# Patient Record
Sex: FEMALE | Race: WHITE | NOT HISPANIC OR LATINO | Employment: FULL TIME | URBAN - METROPOLITAN AREA
[De-identification: names, ages, dates, MRNs, and addresses within clinical notes are randomized per-mention and may not be internally consistent; named-entity substitution may affect disease eponyms.]

---

## 2017-03-23 ENCOUNTER — COMMUNICATION - HEALTHEAST (OUTPATIENT)
Dept: INTERNAL MEDICINE | Facility: CLINIC | Age: 57
End: 2017-03-23

## 2017-03-23 DIAGNOSIS — F41.9 ANXIETY: ICD-10-CM

## 2018-01-04 ENCOUNTER — COMMUNICATION - HEALTHEAST (OUTPATIENT)
Dept: INTERNAL MEDICINE | Facility: CLINIC | Age: 58
End: 2018-01-04

## 2018-01-04 DIAGNOSIS — F41.9 ANXIETY: ICD-10-CM

## 2018-01-12 ENCOUNTER — COMMUNICATION - HEALTHEAST (OUTPATIENT)
Dept: ADMINISTRATIVE | Facility: HOSPITAL | Age: 58
End: 2018-01-12

## 2018-03-06 ENCOUNTER — COMMUNICATION - HEALTHEAST (OUTPATIENT)
Dept: ONCOLOGY | Facility: HOSPITAL | Age: 58
End: 2018-03-06

## 2018-04-03 ENCOUNTER — OFFICE VISIT - HEALTHEAST (OUTPATIENT)
Dept: INTERNAL MEDICINE | Facility: CLINIC | Age: 58
End: 2018-04-03

## 2018-04-03 DIAGNOSIS — E55.9 VITAMIN D DEFICIENCY: ICD-10-CM

## 2018-04-03 DIAGNOSIS — D50.8 IRON DEFICIENCY ANEMIA SECONDARY TO INADEQUATE DIETARY IRON INTAKE: ICD-10-CM

## 2018-04-03 DIAGNOSIS — Z00.00 HEALTH CARE MAINTENANCE: ICD-10-CM

## 2018-04-03 DIAGNOSIS — Z78.0 MENOPAUSE: ICD-10-CM

## 2018-04-03 LAB
ALBUMIN SERPL-MCNC: 3.5 G/DL (ref 3.5–5)
ALBUMIN UR-MCNC: NEGATIVE MG/DL
ALP SERPL-CCNC: 134 U/L (ref 45–120)
ALT SERPL W P-5'-P-CCNC: 12 U/L (ref 0–45)
ANION GAP SERPL CALCULATED.3IONS-SCNC: 15 MMOL/L (ref 5–18)
APPEARANCE UR: CLEAR
AST SERPL W P-5'-P-CCNC: 19 U/L (ref 0–40)
BACTERIA #/AREA URNS HPF: ABNORMAL HPF
BILIRUB DIRECT SERPL-MCNC: 0.1 MG/DL
BILIRUB SERPL-MCNC: 0.3 MG/DL (ref 0–1)
BILIRUB UR QL STRIP: NEGATIVE
BUN SERPL-MCNC: 19 MG/DL (ref 8–22)
CALCIUM SERPL-MCNC: 9.9 MG/DL (ref 8.5–10.5)
CHLORIDE BLD-SCNC: 103 MMOL/L (ref 98–107)
CHOLEST SERPL-MCNC: 238 MG/DL
CO2 SERPL-SCNC: 23 MMOL/L (ref 22–31)
COLOR UR AUTO: YELLOW
CREAT SERPL-MCNC: 0.78 MG/DL (ref 0.6–1.1)
ERYTHROCYTE [DISTWIDTH] IN BLOOD BY AUTOMATED COUNT: 12.7 % (ref 11–14.5)
FASTING STATUS PATIENT QL REPORTED: NO
GFR SERPL CREATININE-BSD FRML MDRD: >60 ML/MIN/1.73M2
GLUCOSE BLD-MCNC: 84 MG/DL (ref 70–125)
GLUCOSE UR STRIP-MCNC: NEGATIVE MG/DL
HCT VFR BLD AUTO: 45 % (ref 35–47)
HDLC SERPL-MCNC: 57 MG/DL
HGB BLD-MCNC: 15 G/DL (ref 12–16)
HGB UR QL STRIP: ABNORMAL
KETONES UR STRIP-MCNC: NEGATIVE MG/DL
LDLC SERPL CALC-MCNC: 145 MG/DL
LEUKOCYTE ESTERASE UR QL STRIP: NEGATIVE
MCH RBC QN AUTO: 28.3 PG (ref 27–34)
MCHC RBC AUTO-ENTMCNC: 33.4 G/DL (ref 32–36)
MCV RBC AUTO: 85 FL (ref 80–100)
NITRATE UR QL: NEGATIVE
PH UR STRIP: 6 [PH] (ref 5–8)
PLATELET # BLD AUTO: 320 THOU/UL (ref 140–440)
PMV BLD AUTO: 7.3 FL (ref 7–10)
POTASSIUM BLD-SCNC: 4.4 MMOL/L (ref 3.5–5)
PROT SERPL-MCNC: 7.3 G/DL (ref 6–8)
RBC # BLD AUTO: 5.32 MILL/UL (ref 3.8–5.4)
RBC #/AREA URNS AUTO: ABNORMAL HPF
SODIUM SERPL-SCNC: 141 MMOL/L (ref 136–145)
SP GR UR STRIP: 1.02 (ref 1–1.03)
TRIGL SERPL-MCNC: 182 MG/DL
TSH SERPL DL<=0.005 MIU/L-ACNC: 1.74 UIU/ML (ref 0.3–5)
UROBILINOGEN UR STRIP-ACNC: ABNORMAL
WBC #/AREA URNS AUTO: ABNORMAL HPF
WBC: 11.3 THOU/UL (ref 4–11)

## 2018-04-04 LAB
25(OH)D3 SERPL-MCNC: 23.4 NG/ML (ref 30–80)
25(OH)D3 SERPL-MCNC: 23.4 NG/ML (ref 30–80)
HPV SOURCE: NORMAL
HUMAN PAPILLOMA VIRUS 16 DNA: NEGATIVE
HUMAN PAPILLOMA VIRUS 18 DNA: NEGATIVE
HUMAN PAPILLOMA VIRUS FINAL DIAGNOSIS: NORMAL
HUMAN PAPILLOMA VIRUS OTHER HR: NEGATIVE
SPECIMEN DESCRIPTION: NORMAL

## 2018-04-09 ENCOUNTER — COMMUNICATION - HEALTHEAST (OUTPATIENT)
Dept: INTERNAL MEDICINE | Facility: CLINIC | Age: 58
End: 2018-04-09

## 2018-04-09 LAB
BKR LAB AP ABNORMAL BLEEDING: NO
BKR LAB AP BIRTH CONTROL/HORMONES: NORMAL
BKR LAB AP CERVICAL APPEARANCE: NORMAL
BKR LAB AP GYN ADEQUACY: NORMAL
BKR LAB AP GYN INTERPRETATION: NORMAL
BKR LAB AP HPV REFLEX: NORMAL
BKR LAB AP LMP: NORMAL
BKR LAB AP PATIENT STATUS: NO
BKR LAB AP PREVIOUS ABNORMAL: NORMAL
BKR LAB AP PREVIOUS NORMAL: NORMAL
HIGH RISK?: NO
PATH REPORT.COMMENTS IMP SPEC: NORMAL
RESULT FLAG (HE HISTORICAL CONVERSION): NORMAL

## 2018-04-13 ENCOUNTER — RECORDS - HEALTHEAST (OUTPATIENT)
Dept: ADMINISTRATIVE | Facility: OTHER | Age: 58
End: 2018-04-13

## 2018-05-16 ENCOUNTER — COMMUNICATION - HEALTHEAST (OUTPATIENT)
Dept: INTERNAL MEDICINE | Facility: CLINIC | Age: 58
End: 2018-05-16

## 2018-05-29 ENCOUNTER — COMMUNICATION - HEALTHEAST (OUTPATIENT)
Dept: ONCOLOGY | Facility: HOSPITAL | Age: 58
End: 2018-05-29

## 2018-06-28 ENCOUNTER — COMMUNICATION - HEALTHEAST (OUTPATIENT)
Dept: ONCOLOGY | Facility: HOSPITAL | Age: 58
End: 2018-06-28

## 2018-08-05 ENCOUNTER — COMMUNICATION - HEALTHEAST (OUTPATIENT)
Dept: INTERNAL MEDICINE | Facility: CLINIC | Age: 58
End: 2018-08-05

## 2018-08-05 DIAGNOSIS — F41.9 ANXIETY: ICD-10-CM

## 2018-09-07 ENCOUNTER — COMMUNICATION - HEALTHEAST (OUTPATIENT)
Dept: INTERNAL MEDICINE | Facility: CLINIC | Age: 58
End: 2018-09-07

## 2018-09-14 ENCOUNTER — COMMUNICATION - HEALTHEAST (OUTPATIENT)
Dept: INTERNAL MEDICINE | Facility: CLINIC | Age: 58
End: 2018-09-14

## 2018-11-12 ENCOUNTER — COMMUNICATION - HEALTHEAST (OUTPATIENT)
Dept: INTERNAL MEDICINE | Facility: CLINIC | Age: 58
End: 2018-11-12

## 2018-11-12 DIAGNOSIS — R04.0 EPISTAXIS: ICD-10-CM

## 2019-02-18 ENCOUNTER — COMMUNICATION - HEALTHEAST (OUTPATIENT)
Dept: INTERNAL MEDICINE | Facility: CLINIC | Age: 59
End: 2019-02-18

## 2019-02-18 DIAGNOSIS — F41.9 ANXIETY: ICD-10-CM

## 2019-05-02 ENCOUNTER — COMMUNICATION - HEALTHEAST (OUTPATIENT)
Dept: INTERNAL MEDICINE | Facility: CLINIC | Age: 59
End: 2019-05-02

## 2019-05-02 DIAGNOSIS — F41.9 ANXIETY: ICD-10-CM

## 2019-06-11 ENCOUNTER — OFFICE VISIT - HEALTHEAST (OUTPATIENT)
Dept: INTERNAL MEDICINE | Facility: CLINIC | Age: 59
End: 2019-06-11

## 2019-06-11 DIAGNOSIS — D72.829 LEUKOCYTOSIS, UNSPECIFIED TYPE: ICD-10-CM

## 2019-06-11 DIAGNOSIS — E53.8 VITAMIN B12 DEFICIENCY (NON ANEMIC): ICD-10-CM

## 2019-06-11 DIAGNOSIS — E55.9 VITAMIN D DEFICIENCY: ICD-10-CM

## 2019-06-11 LAB
BASOPHILS # BLD AUTO: 0.1 THOU/UL (ref 0–0.2)
BASOPHILS NFR BLD AUTO: 1 % (ref 0–2)
EOSINOPHIL # BLD AUTO: 0.4 THOU/UL (ref 0–0.4)
EOSINOPHIL NFR BLD AUTO: 3 % (ref 0–6)
ERYTHROCYTE [DISTWIDTH] IN BLOOD BY AUTOMATED COUNT: 11.9 % (ref 11–14.5)
HCT VFR BLD AUTO: 44.6 % (ref 35–47)
HGB BLD-MCNC: 14.5 G/DL (ref 12–16)
LYMPHOCYTES # BLD AUTO: 2 THOU/UL (ref 0.8–4.4)
LYMPHOCYTES NFR BLD AUTO: 16 % (ref 20–40)
MCH RBC QN AUTO: 28.5 PG (ref 27–34)
MCHC RBC AUTO-ENTMCNC: 32.5 G/DL (ref 32–36)
MCV RBC AUTO: 88 FL (ref 80–100)
MONOCYTES # BLD AUTO: 1 THOU/UL (ref 0–0.9)
MONOCYTES NFR BLD AUTO: 8 % (ref 2–10)
NEUTROPHILS # BLD AUTO: 9.2 THOU/UL (ref 2–7.7)
NEUTROPHILS NFR BLD AUTO: 72 % (ref 50–70)
PLATELET # BLD AUTO: 348 THOU/UL (ref 140–440)
PMV BLD AUTO: 7.9 FL (ref 7–10)
RBC # BLD AUTO: 5.09 MILL/UL (ref 3.8–5.4)
VIT B12 SERPL-MCNC: 791 PG/ML (ref 213–816)
WBC: 12.7 THOU/UL (ref 4–11)

## 2019-06-12 LAB
25(OH)D3 SERPL-MCNC: 32.7 NG/ML (ref 30–80)
25(OH)D3 SERPL-MCNC: 32.7 NG/ML (ref 30–80)
BASOPHILS # BLD AUTO: 0.1 THOU/UL (ref 0–0.2)
BASOPHILS NFR BLD AUTO: 1 % (ref 0–2)
EOSINOPHIL # BLD AUTO: 0.3 THOU/UL (ref 0–0.4)
EOSINOPHIL NFR BLD AUTO: 3 % (ref 0–6)
ERYTHROCYTE [DISTWIDTH] IN BLOOD BY AUTOMATED COUNT: 13.9 % (ref 11–14.5)
HCT VFR BLD AUTO: 46.2 % (ref 35–47)
HGB BLD-MCNC: 14.4 G/DL (ref 12–16)
LAB AP CHARGES (HE HISTORICAL CONVERSION): NORMAL
LYMPHOCYTES # BLD AUTO: 1.9 THOU/UL (ref 0.8–4.4)
LYMPHOCYTES NFR BLD AUTO: 16 % (ref 20–40)
MCH RBC QN AUTO: 29.8 PG (ref 27–34)
MCHC RBC AUTO-ENTMCNC: 31.2 G/DL (ref 32–36)
MCV RBC AUTO: 96 FL (ref 80–100)
MONOCYTES # BLD AUTO: 0.9 THOU/UL (ref 0–0.9)
MONOCYTES NFR BLD AUTO: 7 % (ref 2–10)
NEUTROPHILS # BLD AUTO: 9 THOU/UL (ref 2–7.7)
NEUTROPHILS NFR BLD AUTO: 74 % (ref 50–70)
PATH REPORT.COMMENTS IMP SPEC: NORMAL
PATH REPORT.COMMENTS IMP SPEC: NORMAL
PATH REPORT.FINAL DX SPEC: NORMAL
PATH REPORT.MICROSCOPIC SPEC OTHER STN: ABNORMAL
PATH REPORT.MICROSCOPIC SPEC OTHER STN: NORMAL
PATH REPORT.RELEVANT HX SPEC: NORMAL
PLATELET # BLD AUTO: 323 THOU/UL (ref 140–440)
PMV BLD AUTO: 10.1 FL (ref 8.5–12.5)
RBC # BLD AUTO: 4.84 MILL/UL (ref 3.8–5.4)
WBC: 12.2 THOU/UL (ref 4–11)

## 2019-06-20 ENCOUNTER — COMMUNICATION - HEALTHEAST (OUTPATIENT)
Dept: INTERNAL MEDICINE | Facility: CLINIC | Age: 59
End: 2019-06-20

## 2019-07-15 ENCOUNTER — COMMUNICATION - HEALTHEAST (OUTPATIENT)
Dept: INTERNAL MEDICINE | Facility: CLINIC | Age: 59
End: 2019-07-15

## 2019-08-12 ENCOUNTER — COMMUNICATION - HEALTHEAST (OUTPATIENT)
Dept: INTERNAL MEDICINE | Facility: CLINIC | Age: 59
End: 2019-08-12

## 2019-08-12 DIAGNOSIS — F41.9 ANXIETY: ICD-10-CM

## 2019-08-13 ENCOUNTER — AMBULATORY - HEALTHEAST (OUTPATIENT)
Dept: INTERNAL MEDICINE | Facility: CLINIC | Age: 59
End: 2019-08-13

## 2019-08-13 DIAGNOSIS — F41.9 ANXIETY: ICD-10-CM

## 2020-01-24 ENCOUNTER — OFFICE VISIT - HEALTHEAST (OUTPATIENT)
Dept: INTERNAL MEDICINE | Facility: CLINIC | Age: 60
End: 2020-01-24

## 2020-01-24 DIAGNOSIS — M25.511 ACUTE PAIN OF RIGHT SHOULDER: ICD-10-CM

## 2020-01-24 DIAGNOSIS — R10.11 RUQ ABDOMINAL PAIN: ICD-10-CM

## 2020-01-24 DIAGNOSIS — Z12.11 COLON CANCER SCREENING: ICD-10-CM

## 2020-01-24 DIAGNOSIS — F41.1 ANXIETY STATE: ICD-10-CM

## 2020-01-24 DIAGNOSIS — I89.0 LYMPHEDEMA OF RIGHT ARM: ICD-10-CM

## 2020-01-24 DIAGNOSIS — G62.9 NEUROPATHY: ICD-10-CM

## 2020-01-24 DIAGNOSIS — Z11.4 SCREENING FOR HIV (HUMAN IMMUNODEFICIENCY VIRUS): ICD-10-CM

## 2020-01-24 DIAGNOSIS — Z11.59 NEED FOR HEPATITIS C SCREENING TEST: ICD-10-CM

## 2020-01-24 LAB
ALBUMIN SERPL-MCNC: 3.5 G/DL (ref 3.5–5)
ALBUMIN UR-MCNC: NEGATIVE MG/DL
ALP SERPL-CCNC: 253 U/L (ref 45–120)
ALT SERPL W P-5'-P-CCNC: 184 U/L (ref 0–45)
ANION GAP SERPL CALCULATED.3IONS-SCNC: 13 MMOL/L (ref 5–18)
APPEARANCE UR: CLEAR
AST SERPL W P-5'-P-CCNC: 182 U/L (ref 0–40)
BILIRUB SERPL-MCNC: 1 MG/DL (ref 0–1)
BILIRUB UR QL STRIP: NEGATIVE
BUN SERPL-MCNC: 15 MG/DL (ref 8–22)
CALCIUM SERPL-MCNC: 9.2 MG/DL (ref 8.5–10.5)
CHLORIDE BLD-SCNC: 106 MMOL/L (ref 98–107)
CO2 SERPL-SCNC: 23 MMOL/L (ref 22–31)
COLOR UR AUTO: YELLOW
CREAT SERPL-MCNC: 0.72 MG/DL (ref 0.6–1.1)
GFR SERPL CREATININE-BSD FRML MDRD: >60 ML/MIN/1.73M2
GLUCOSE BLD-MCNC: 103 MG/DL (ref 70–125)
GLUCOSE UR STRIP-MCNC: NEGATIVE MG/DL
HGB UR QL STRIP: NEGATIVE
HIV 1+2 AB+HIV1 P24 AG SERPL QL IA: NEGATIVE
KETONES UR STRIP-MCNC: NEGATIVE MG/DL
LEUKOCYTE ESTERASE UR QL STRIP: NEGATIVE
LIPASE SERPL-CCNC: 40 U/L (ref 0–52)
NITRATE UR QL: NEGATIVE
PH UR STRIP: 6 [PH] (ref 4.5–8)
POTASSIUM BLD-SCNC: 3.8 MMOL/L (ref 3.5–5)
PROT SERPL-MCNC: 7 G/DL (ref 6–8)
SODIUM SERPL-SCNC: 142 MMOL/L (ref 136–145)
SP GR UR STRIP: 1.02 (ref 1–1.03)
TSH SERPL DL<=0.005 MIU/L-ACNC: 1.48 UIU/ML (ref 0.3–5)
UROBILINOGEN UR STRIP-ACNC: NORMAL

## 2020-01-25 LAB — HCV AB SERPL QL IA: NEGATIVE

## 2020-01-30 ENCOUNTER — AMBULATORY - HEALTHEAST (OUTPATIENT)
Dept: INTERNAL MEDICINE | Facility: CLINIC | Age: 60
End: 2020-01-30

## 2020-01-30 ENCOUNTER — COMMUNICATION - HEALTHEAST (OUTPATIENT)
Dept: INTERNAL MEDICINE | Facility: CLINIC | Age: 60
End: 2020-01-30

## 2020-01-30 ENCOUNTER — HOSPITAL ENCOUNTER (OUTPATIENT)
Dept: ULTRASOUND IMAGING | Facility: HOSPITAL | Age: 60
Discharge: HOME | End: 2020-01-30
Attending: INTERNAL MEDICINE

## 2020-01-30 DIAGNOSIS — R10.11 RUQ ABDOMINAL PAIN: ICD-10-CM

## 2020-01-30 DIAGNOSIS — K80.20 CALCULUS OF GALLBLADDER WITHOUT CHOLECYSTITIS WITHOUT OBSTRUCTION: ICD-10-CM

## 2020-02-19 ENCOUNTER — ANESTHESIA - HEALTHEAST (OUTPATIENT)
Dept: SURGERY | Facility: CLINIC | Age: 60
End: 2020-02-19

## 2020-02-19 ENCOUNTER — SURGERY - HEALTHEAST (OUTPATIENT)
Dept: SURGERY | Facility: CLINIC | Age: 60
End: 2020-02-19

## 2020-02-19 ASSESSMENT — MIFFLIN-ST. JEOR: SCORE: 1699.09

## 2020-03-08 ENCOUNTER — RECORDS - HEALTHEAST (OUTPATIENT)
Dept: ADMINISTRATIVE | Facility: OTHER | Age: 60
End: 2020-03-08

## 2020-03-08 LAB — COLOGUARD-ABSTRACT: NEGATIVE

## 2020-03-17 ENCOUNTER — RECORDS - HEALTHEAST (OUTPATIENT)
Dept: HEALTH INFORMATION MANAGEMENT | Facility: CLINIC | Age: 60
End: 2020-03-17

## 2020-04-03 ENCOUNTER — COMMUNICATION - HEALTHEAST (OUTPATIENT)
Dept: INTERNAL MEDICINE | Facility: CLINIC | Age: 60
End: 2020-04-03

## 2020-04-07 ENCOUNTER — COMMUNICATION - HEALTHEAST (OUTPATIENT)
Dept: INTERNAL MEDICINE | Facility: CLINIC | Age: 60
End: 2020-04-07

## 2020-04-07 DIAGNOSIS — R52 PAIN: ICD-10-CM

## 2020-04-14 ENCOUNTER — COMMUNICATION - HEALTHEAST (OUTPATIENT)
Dept: INTERNAL MEDICINE | Facility: CLINIC | Age: 60
End: 2020-04-14

## 2020-04-14 DIAGNOSIS — G62.9 NEUROPATHY: ICD-10-CM

## 2020-04-14 DIAGNOSIS — R52 PAIN: ICD-10-CM

## 2020-04-27 ENCOUNTER — COMMUNICATION - HEALTHEAST (OUTPATIENT)
Dept: INTERNAL MEDICINE | Facility: CLINIC | Age: 60
End: 2020-04-27

## 2020-06-21 ENCOUNTER — COMMUNICATION - HEALTHEAST (OUTPATIENT)
Dept: INTERNAL MEDICINE | Facility: CLINIC | Age: 60
End: 2020-06-21

## 2020-06-23 ENCOUNTER — COMMUNICATION - HEALTHEAST (OUTPATIENT)
Dept: LAB | Facility: CLINIC | Age: 60
End: 2020-06-23

## 2020-07-29 ENCOUNTER — OFFICE VISIT - HEALTHEAST (OUTPATIENT)
Dept: INTERNAL MEDICINE | Facility: CLINIC | Age: 60
End: 2020-07-29

## 2020-07-29 DIAGNOSIS — E66.01 MORBID OBESITY (H): ICD-10-CM

## 2020-07-29 DIAGNOSIS — G62.9 NEUROPATHY: ICD-10-CM

## 2020-07-29 DIAGNOSIS — Z90.49 S/P LAPAROSCOPIC CHOLECYSTECTOMY: ICD-10-CM

## 2020-07-29 DIAGNOSIS — M75.101 ROTATOR CUFF SYNDROME, RIGHT: ICD-10-CM

## 2020-07-29 DIAGNOSIS — C50.911: ICD-10-CM

## 2020-07-29 ASSESSMENT — PATIENT HEALTH QUESTIONNAIRE - PHQ9: SUM OF ALL RESPONSES TO PHQ QUESTIONS 1-9: 0

## 2020-08-15 ENCOUNTER — COMMUNICATION - HEALTHEAST (OUTPATIENT)
Dept: INTERNAL MEDICINE | Facility: CLINIC | Age: 60
End: 2020-08-15

## 2020-08-17 ENCOUNTER — AMBULATORY - HEALTHEAST (OUTPATIENT)
Dept: INTERNAL MEDICINE | Facility: CLINIC | Age: 60
End: 2020-08-17

## 2020-08-17 DIAGNOSIS — G62.9 NEUROPATHY: ICD-10-CM

## 2020-08-26 ENCOUNTER — OFFICE VISIT - HEALTHEAST (OUTPATIENT)
Dept: OTOLARYNGOLOGY | Facility: CLINIC | Age: 60
End: 2020-08-26

## 2020-08-26 DIAGNOSIS — R04.0 EPISTAXIS: ICD-10-CM

## 2020-08-27 ENCOUNTER — COMMUNICATION - HEALTHEAST (OUTPATIENT)
Dept: INTERNAL MEDICINE | Facility: CLINIC | Age: 60
End: 2020-08-27

## 2020-09-11 ENCOUNTER — COMMUNICATION - HEALTHEAST (OUTPATIENT)
Dept: INTERNAL MEDICINE | Facility: CLINIC | Age: 60
End: 2020-09-11

## 2020-10-26 ENCOUNTER — COMMUNICATION - HEALTHEAST (OUTPATIENT)
Dept: INTERNAL MEDICINE | Facility: CLINIC | Age: 60
End: 2020-10-26

## 2020-11-02 ENCOUNTER — AMBULATORY - HEALTHEAST (OUTPATIENT)
Dept: INTERNAL MEDICINE | Facility: CLINIC | Age: 60
End: 2020-11-02

## 2020-11-02 DIAGNOSIS — G62.9 NEUROPATHY: ICD-10-CM

## 2020-11-04 ENCOUNTER — COMMUNICATION - HEALTHEAST (OUTPATIENT)
Dept: INTERNAL MEDICINE | Facility: CLINIC | Age: 60
End: 2020-11-04

## 2020-11-20 ENCOUNTER — OFFICE VISIT - HEALTHEAST (OUTPATIENT)
Dept: PHARMACY | Facility: CLINIC | Age: 60
End: 2020-11-20

## 2020-11-20 DIAGNOSIS — T45.1X5A CHEMOTHERAPY-INDUCED NEUROPATHY (H): ICD-10-CM

## 2020-11-20 DIAGNOSIS — G62.0 CHEMOTHERAPY-INDUCED NEUROPATHY (H): ICD-10-CM

## 2020-11-20 DIAGNOSIS — F41.1 ANXIETY STATE: ICD-10-CM

## 2020-11-20 DIAGNOSIS — M19.90 ARTHRITIS: ICD-10-CM

## 2020-11-20 PROCEDURE — 99607 MTMS BY PHARM ADDL 15 MIN: CPT | Performed by: PHARMACIST

## 2020-11-20 PROCEDURE — 99605 MTMS BY PHARM NP 15 MIN: CPT | Performed by: PHARMACIST

## 2020-11-20 RX ORDER — LORAZEPAM 1 MG/1
1 TABLET ORAL PRN
Status: SHIPPED | COMMUNITY
Start: 2020-11-20

## 2020-11-20 RX ORDER — ACETAMINOPHEN 500 MG
500-1000 TABLET ORAL 3 TIMES DAILY PRN
Status: SHIPPED | COMMUNITY
Start: 2020-11-20 | End: 2021-12-29

## 2020-11-21 ENCOUNTER — COMMUNICATION - HEALTHEAST (OUTPATIENT)
Dept: NURSING | Facility: CLINIC | Age: 60
End: 2020-11-21

## 2020-12-04 ENCOUNTER — AMBULATORY - HEALTHEAST (OUTPATIENT)
Dept: PHARMACY | Facility: CLINIC | Age: 60
End: 2020-12-04

## 2020-12-04 DIAGNOSIS — M19.90 ARTHRITIS: ICD-10-CM

## 2020-12-04 DIAGNOSIS — F41.1 ANXIETY STATE: ICD-10-CM

## 2020-12-04 DIAGNOSIS — G62.0 CHEMOTHERAPY-INDUCED NEUROPATHY (H): ICD-10-CM

## 2020-12-04 DIAGNOSIS — T45.1X5A CHEMOTHERAPY-INDUCED NEUROPATHY (H): ICD-10-CM

## 2020-12-04 PROCEDURE — 99606 MTMS BY PHARM EST 15 MIN: CPT | Mod: GT | Performed by: PHARMACIST

## 2021-01-22 ENCOUNTER — AMBULATORY - HEALTHEAST (OUTPATIENT)
Dept: PHARMACY | Facility: CLINIC | Age: 61
End: 2021-01-22

## 2021-01-22 DIAGNOSIS — T45.1X5A CHEMOTHERAPY-INDUCED NEUROPATHY (H): ICD-10-CM

## 2021-01-22 DIAGNOSIS — F41.1 ANXIETY STATE: ICD-10-CM

## 2021-01-22 DIAGNOSIS — E55.9 VITAMIN D DEFICIENCY: ICD-10-CM

## 2021-01-22 DIAGNOSIS — M19.90 ARTHRITIS: ICD-10-CM

## 2021-01-22 DIAGNOSIS — G62.0 CHEMOTHERAPY-INDUCED NEUROPATHY (H): ICD-10-CM

## 2021-01-22 PROCEDURE — 99607 MTMS BY PHARM ADDL 15 MIN: CPT | Performed by: PHARMACIST

## 2021-01-22 PROCEDURE — 99605 MTMS BY PHARM NP 15 MIN: CPT | Performed by: PHARMACIST

## 2021-01-28 ENCOUNTER — COMMUNICATION - HEALTHEAST (OUTPATIENT)
Dept: NURSING | Facility: CLINIC | Age: 61
End: 2021-01-28

## 2021-01-28 RX ORDER — DULOXETIN HYDROCHLORIDE 60 MG/1
60 CAPSULE, DELAYED RELEASE ORAL EVERY MORNING
Status: SHIPPED | COMMUNITY
Start: 2021-01-28 | End: 2021-07-23

## 2021-02-15 ENCOUNTER — COMMUNICATION - HEALTHEAST (OUTPATIENT)
Dept: PHARMACY | Facility: CLINIC | Age: 61
End: 2021-02-15

## 2021-02-15 DIAGNOSIS — F41.1 ANXIETY STATE: ICD-10-CM

## 2021-03-01 RX ORDER — GABAPENTIN 300 MG/1
300 CAPSULE ORAL AT BEDTIME
Qty: 90 CAPSULE | Refills: 3 | Status: SHIPPED | OUTPATIENT
Start: 2021-03-01 | End: 2021-07-23

## 2021-03-22 ENCOUNTER — AMBULATORY - HEALTHEAST (OUTPATIENT)
Dept: NURSING | Facility: CLINIC | Age: 61
End: 2021-03-22

## 2021-04-12 ENCOUNTER — AMBULATORY - HEALTHEAST (OUTPATIENT)
Dept: NURSING | Facility: CLINIC | Age: 61
End: 2021-04-12

## 2021-05-01 ENCOUNTER — HEALTH MAINTENANCE LETTER (OUTPATIENT)
Age: 61
End: 2021-05-01

## 2021-05-16 ENCOUNTER — COMMUNICATION - HEALTHEAST (OUTPATIENT)
Dept: INTERNAL MEDICINE | Facility: CLINIC | Age: 61
End: 2021-05-16

## 2021-05-16 DIAGNOSIS — R52 PAIN: ICD-10-CM

## 2021-05-17 RX ORDER — CELECOXIB 200 MG/1
200 CAPSULE ORAL 2 TIMES DAILY PRN
Qty: 90 CAPSULE | Refills: 3 | Status: SHIPPED | OUTPATIENT
Start: 2021-05-17 | End: 2022-09-16

## 2021-05-20 ENCOUNTER — COMMUNICATION - HEALTHEAST (OUTPATIENT)
Dept: INTERNAL MEDICINE | Facility: CLINIC | Age: 61
End: 2021-05-20

## 2021-05-24 ENCOUNTER — RECORDS - HEALTHEAST (OUTPATIENT)
Dept: ADMINISTRATIVE | Facility: CLINIC | Age: 61
End: 2021-05-24

## 2021-05-24 ENCOUNTER — RECORDS - HEALTHEAST (OUTPATIENT)
Dept: INTERNAL MEDICINE | Facility: CLINIC | Age: 61
End: 2021-05-24

## 2021-05-27 ASSESSMENT — PATIENT HEALTH QUESTIONNAIRE - PHQ9: SUM OF ALL RESPONSES TO PHQ QUESTIONS 1-9: 0

## 2021-05-28 NOTE — TELEPHONE ENCOUNTER
Former patient of [facility] & has not established care with another provider.  Please assign refill request to covering provider per Clinic standard process.      RN cannot approve Refill Request    RN can NOT refill this medication PCP messaged that patient is overdue for Labs and Office Visit.      Juliana Castañeda, [facility] Connection Triage/Med Refill 5/3/2019    Requested Prescriptions   Pending Prescriptions Disp Refills     venlafaxine (EFFEXOR-XR) 37.5 MG 24 hr capsule [Pharmacy Med Name: VENLAFAXINE HCL ER CAPS 37.5MG] 90 capsule 0     Sig: TAKE 1 CAPSULE DAILY       Venlafaxine/Desvenlafaxine Refill Protocol Failed - 5/2/2019  8:23 PM        Failed - LFT or AST or ALT in last year     Albumin   Date Value Ref Range Status   04/03/2018 3.5 3.5 - 5.0 g/dL Final     Bilirubin, Total   Date Value Ref Range Status   04/03/2018 0.3 0.0 - 1.0 mg/dL Final     Bilirubin, Direct   Date Value Ref Range Status   04/03/2018 0.1 <=0.5 mg/dL Final     Alkaline Phosphatase   Date Value Ref Range Status   04/03/2018 134 (H) 45 - 120 U/L Final     AST   Date Value Ref Range Status   04/03/2018 19 0 - 40 U/L Final     ALT   Date Value Ref Range Status   04/03/2018 12 0 - 45 U/L Final     Protein, Total   Date Value Ref Range Status   04/03/2018 7.3 6.0 - 8.0 g/dL Final                Failed - Fasting lipid cascade in last year     Cholesterol   Date Value Ref Range Status   04/03/2018 238 (H) <=199 mg/dL Final     Triglycerides   Date Value Ref Range Status   04/03/2018 182 (H) <=149 mg/dL Final     HDL Cholesterol   Date Value Ref Range Status   04/03/2018 57 >=50 mg/dL Final     LDL Calculated   Date Value Ref Range Status   04/03/2018 145 (H) <=129 mg/dL Final     Patient Fasting > 8hrs?   Date Value Ref Range Status   04/03/2018 No  Final             Failed - PCP or prescribing provider visit in last year     Last office visit with prescriber/PCP: Visit date not found OR same dept: Visit date not found OR same specialty:  4/3/2018 Gladys Erwin MD  Last physical: Visit date not found Last MTM visit: Visit date not found   Next visit within 3 mo: Visit date not found  Next physical within 3 mo: Visit date not found  Prescriber OR PCP: Augusto Brady MD  Last diagnosis associated with med order: 1. Anxiety  - venlafaxine (EFFEXOR-XR) 37.5 MG 24 hr capsule [Pharmacy Med Name: VENLAFAXINE HCL ER CAPS 37.5MG]; TAKE 1 CAPSULE DAILY  Dispense: 90 capsule; Refill: 0    If protocol passes may refill for 12 months if within 3 months of last provider visit (or a total of 15 months).             Failed - Blood Pressure in last year     BP Readings from Last 1 Encounters:   04/03/18 124/78

## 2021-05-29 NOTE — PROGRESS NOTES
[facility]  Internal Medicine - Office Visit    Patient: Victorina Goldman   MRN: [id number]   Date of Service: 06/11/19   Patient Care Team:  Provider, No Primary Care as PCP - Solomon Hatfield MD as Physician (Hematology and Oncology)  Kiara Pelletier RN as Registered Nurse (Hematology and Oncology)    ASSESSMENT/PLAN     Victorina Goldman is a 58-year-old woman with history of anxiety and history of breast cancer status post bilateral mastectomy with breast reconstruction here to establish care.    Vitamin B12 deficiency (non anemic)   Patient is on a daily vitamin B12 which per patient was started during treatment for breast cancer.  We will check her vitamin B12 levels today.  -     Vitamin B12    Vitamin D deficiency  Patient taking a daily vitamin D.  We will check her vitamin D level today.  -     Vitamin D, Total (25-Hydroxy)    Leukocytosis, unspecified type  Patient has had chronic leukocytosis.  We will recheck this today along with the peripheral blood smear.  She does follow with Dr. Pizano with Heme/Onc, though has been several years since being seen and did not follow up per Heme/Onc recommendation.  -     HM1(CBC and Differential)  -     HM1 (CBC with Diff)  -     Morphology,Smear Review (MORP)  -     Peripheral Blood Smear, Path Review      Eros Rios MD  Internal Medicine and Pediatrics  [facility]  Pager [phone]    SUBJECTIVE     Victorina Goldman is a 58-year-old woman with history of anxiety and history of breast cancer status post bilateral mastectomy with breast reconstruction here to establish care.    Her main question today is that during the winter she tends to get frequent nosebleeds.  Last year she wanted a referral for ENT to consider cauterization, however the bleeding stopped and so she did not follow through with it.  She is wondering the future if I would be willing to give her a referral to ear nose and throat  "for her nose cauterization.    She has also had episodes in the past for she has had severe depression after having had significant amount of fluids.  First examples when she had a colonoscopy and was taking GoLYTELY.  The second example is when she drinks a lot of water per the request of a natural healer.  She is wondering if flushing of the effects or cause some kind of rebound depression.  She had severe depression afterwards.    She takes vitamin D and vitamin B12.  She is not sure why she is on vitamin B12.  She reports that she was started on it during treatment for her breast cancer and then just has maintained on it.    Review of Systems  Pertinent items are noted in HPI    Past Medical/Surgical History  Reviewed and updated as appropriate    Immunizations  Reviewed.    Medications    Current Outpatient Medications:      celecoxib (CELEBREX) 200 MG capsule, Take 1 capsule (200 mg total) by mouth 2 (two) times a day as needed., Disp: 90 capsule, Rfl: 3     cholecalciferol, vitamin D3, 5,000 unit Tab, Take 5,000 Units by mouth daily., Disp: , Rfl:      cyanocobalamin 125 mcg, Take 250 mcg by mouth daily., Disp: , Rfl:      venlafaxine (EFFEXOR-XR) 37.5 MG 24 hr capsule, TAKE 1 CAPSULE DAILY, Disp: 90 capsule, Rfl: 0     lansoprazole (PREVACID) 15 MG capsule, Take 15 mg by mouth as needed. , Disp: , Rfl:      triamcinolone (KENALOG) 0.1 % cream, Apply to rash bid prn, Disp: 80 g, Rfl: 5    Allergies  Allergies   Allergen Reactions     Erythromycin Base      Mold Tinnitus       Family History  Reviewed and updated as appropriate.     Social History  Reviewed and updated as appropriate.          OBJECTIVE       /80 (Patient Site: Left Arm, Patient Position: Sitting, Cuff Size: Adult Regular)   Pulse 70   Resp 16   Ht 5' 6\" (1.676 m)   SpO2 98%   BMI 37.61 kg/m      General Appearance:    Alert, cooperative, no distress, appears stated age   Head:    Normocephalic, without obvious abnormality, " atraumatic   Lungs:     Clear to auscultation bilaterally, respirations unlabored    Heart:    Regular rate and rhythm, S1 and S2 normal, no murmur, rub    or gallop   Neurologic:   CNII-XII grossly intact, normal strength and tone throughout     Labs/imaging/studies:  Reviewed

## 2021-05-31 ENCOUNTER — RECORDS - HEALTHEAST (OUTPATIENT)
Dept: ADMINISTRATIVE | Facility: CLINIC | Age: 61
End: 2021-05-31

## 2021-05-31 NOTE — TELEPHONE ENCOUNTER
RN cannot approve Refill Request    RN can NOT refill this medication PCP messaged that patient is overdue for Labs. Last office visit: Visit date not found Last Physical: Visit date not found Last MTM visit: Visit date not found Last visit same specialty: 4/3/2018 Gladys Erwin MD.  Next visit within 3 mo: Visit date not found  Next physical within 3 mo: Visit date not found      Shana COLLINS Vito, Care Connection Triage/Med Refill 2019    Requested Prescriptions   Pending Prescriptions Disp Refills     venlafaxine (EFFEXOR-XR) 37.5 MG 24 hr capsule 90 capsule 0     Si capsule (37.5 mg total) daily.       Venlafaxine/Desvenlafaxine Refill Protocol Failed - 2019  6:16 AM        Failed - LFT or AST or ALT in last year     Albumin   Date Value Ref Range Status   2018 3.5 3.5 - 5.0 g/dL Final     Bilirubin, Total   Date Value Ref Range Status   2018 0.3 0.0 - 1.0 mg/dL Final     Bilirubin, Direct   Date Value Ref Range Status   2018 0.1 <=0.5 mg/dL Final     Alkaline Phosphatase   Date Value Ref Range Status   2018 134 (H) 45 - 120 U/L Final     AST   Date Value Ref Range Status   2018 19 0 - 40 U/L Final     ALT   Date Value Ref Range Status   2018 12 0 - 45 U/L Final     Protein, Total   Date Value Ref Range Status   2018 7.3 6.0 - 8.0 g/dL Final                Failed - Fasting lipid cascade in last year     Cholesterol   Date Value Ref Range Status   2018 238 (H) <=199 mg/dL Final     Triglycerides   Date Value Ref Range Status   2018 182 (H) <=149 mg/dL Final     HDL Cholesterol   Date Value Ref Range Status   2018 57 >=50 mg/dL Final     LDL Calculated   Date Value Ref Range Status   2018 145 (H) <=129 mg/dL Final     Patient Fasting > 8hrs?   Date Value Ref Range Status   2018 No  Final             Passed - PCP or prescribing provider visit in last year     Last office visit with prescriber/PCP: Visit date not found OR same  dept: Visit date not found OR same specialty: 4/3/2018 Gladys Erwin MD  Last physical: Visit date not found Last MTM visit: Visit date not found   Next visit within 3 mo: Visit date not found  Next physical within 3 mo: Visit date not found  Prescriber OR PCP: Ronak Ram DO  Last diagnosis associated with med order: 1. Anxiety  - venlafaxine (EFFEXOR-XR) 37.5 MG 24 hr capsule; 1 capsule (37.5 mg total) daily.  Dispense: 90 capsule; Refill: 0    If protocol passes may refill for 12 months if within 3 months of last provider visit (or a total of 15 months).             Passed - Blood Pressure in last year     BP Readings from Last 1 Encounters:   06/11/19 122/80

## 2021-06-01 ENCOUNTER — RECORDS - HEALTHEAST (OUTPATIENT)
Dept: ADMINISTRATIVE | Facility: CLINIC | Age: 61
End: 2021-06-01

## 2021-06-02 ENCOUNTER — RECORDS - HEALTHEAST (OUTPATIENT)
Dept: ADMINISTRATIVE | Facility: CLINIC | Age: 61
End: 2021-06-02

## 2021-06-03 VITALS — BODY MASS INDEX: 37.61 KG/M2 | HEIGHT: 66 IN

## 2021-06-04 VITALS — WEIGHT: 244.13 LBS | HEIGHT: 66 IN | BODY MASS INDEX: 39.24 KG/M2

## 2021-06-04 VITALS
OXYGEN SATURATION: 98 % | BODY MASS INDEX: 37.61 KG/M2 | DIASTOLIC BLOOD PRESSURE: 86 MMHG | HEIGHT: 66 IN | SYSTOLIC BLOOD PRESSURE: 132 MMHG | HEART RATE: 82 BPM

## 2021-06-05 NOTE — PATIENT INSTRUCTIONS - HE
Your story sounds most like gallstones or common bile duct stones    Update liver and pancreas tests    We can update a gallbladder ultrasound    Update blood and urine    We can try a vitamin B 12 shot for neuropathy in the feet    Refill venlafaxine    Ok for lorazepam as needed    Consider medical cannabis    I agree with the new shingles shot    Rotator cuff tear of the right shoulder.  Start with physical therapy for shoulder and possible lymphedema

## 2021-06-05 NOTE — PROGRESS NOTES
ASSESSMENT:  1. RUQ abdominal pain  Symptoms classic for hepatobiliary colic with lightening of stools and darkening of urine and intermittent symptoms over the last several years.  Update evaluation including gallbladder ultrasound.  Reviewed old gallbladder ultrasound  - Comprehensive Metabolic Panel  - Urinalysis-UC if Indicated  - US Abdomen Limited; Future  - Lipase    2. Anxiety  Refill.  Check thyroid.  Consider Lamictal or medical cannabis.  Discussed increase or decrease dose  - venlafaxine (EFFEXOR-XR) 75 MG 24 hr capsule; Take 1 capsule (75 mg total) by mouth daily.  Dispense: 90 capsule; Refill: 3  - Thyroid Stimulating Hormone (TSH)    3. Colon cancer screening  Reviewed 2010 colonoscopy.  Conservative screening is reasonable  - Cologuard    4. Need for hepatitis C screening test  - Hepatitis C Antibody (Anti-HCV)    5. Screening for HIV (human immunodeficiency virus)  - HIV Antigen/Antibody Screening [place]    6. Neuropathy  Status post chemo for breast cancer.  Trial of B12 shot  - cyanocobalamin injection 1,000 mcg    7. Acute pain of right shoulder  History suggests rotator cuff tear.  Begin with physical therapy  - Ambulatory referral to Adult PT- Internal    8. Lymphedema of right arm  Right arm also surgical breast cancer site.  Lymphedema eval per therapy  - Ambulatory referral to Adult PT- Internal      Health maintenance reviewed    PLAN:  Patient Instructions   Your story sounds most like gallstones or common bile duct stones    Update liver and pancreas tests    We can update a gallbladder ultrasound    Update blood and urine    We can try a vitamin B 12 shot for neuropathy in the feet    Refill venlafaxine    Ok for lorazepam as needed    Consider medical cannabis    I agree with the new shingles shot    Rotator cuff tear of the right shoulder.  Start with physical therapy for shoulder and possible lymphedema                  Orders Placed This Encounter   Procedures     US Abdomen Limited      Standing Status:   Future     Standing Expiration Date:   1/23/2021     Order Specific Question:   Can the procedure be changed per Radiologist protocol?     Answer:   Yes     Order Specific Question:   Is the patient pregnant?     Answer:   No     Hepatitis C Antibody (Anti-HCV)     Comprehensive Metabolic Panel     Thyroid Stimulating Hormone (TSH)     Urinalysis-UC if Indicated     Lipase     HIV Antigen/Antibody Screening [place]     Ambulatory referral to Adult PT- Internal     Referral Priority:   Routine     Referral Type:   Physical Therapy     Referral Reason:   Evaluation and Treatment     Requested Specialty:   Physical Therapy     Number of Visits Requested:   1     Ambulatory referral to Adult PT- Internal     Referral Priority:   Routine     Referral Type:   Physical Therapy     Referral Reason:   Evaluation and Treatment     Requested Specialty:   Physical Therapy     Number of Visits Requested:   1     Cologuard     Medications Discontinued During This Encounter   Medication Reason     venlafaxine (EFFEXOR-XR) 75 MG 24 hr capsule Reorder     Administrations This Visit     cyanocobalamin injection 1,000 mcg     Admin Date  01/24/2020 Action  Given Dose  1000 mcg Route  Intramuscular Administered By  Isa Frederick LPN              Return in about 6 months (around 7/24/2020) for for a full review of medications and lab testing.      CHIEF COMPLAINT:  Chief Complaint   Patient presents with     Medication Management       HISTORY OF PRESENT ILLNESS:  Victorina is a 59 y.o. female presenting to the clinic today for medication management but also complains of abdominal pain.     Abdominal Pain: Kiara states for the second time in a year she has had discolored urine and stool. She notes these instances appear after a couple days of abdominal swelling and discomfort. Kiara confirms she gets abdominal pain and swelling about twice a month but the discolored urine is new. She notes past instance of  "discolored stool in 2019. Kiara describes her urine as a dark, yellow almost brown color while the stool is white. During her abdominal discomfort Kiara felt she could not get her food to digest and describes a full feeling 12 hours after eating. Kiara admits to mild nausea, and stomach tightening that dissipates after not eating for some time. If she tries to eat during the discomfort it just heightens her nausea. She has tried eating asian type foods like rice and veggies that seem to agree with her. Kiara confirms a family history of gallbladder removals.     Anxiety: Kiara has been on baclofen for some times and believes her dose is appropriate.   She notes when she stops for some time and flushes it out of her system new strong depression symptoms appear. Kiara states her anxiety leads her to over think things like believing her stomach pain is her Breast cancer returning and past shoulder injuries wont heal.     Shoulder pain: Kiara states while reaching into the backseat to grab her purse she heard a rip in her shoulder as she brought the item forward. She denies consistent pain and only feels weakness or discomfort with certain movements.     Health Maintenance: Shingrix discussed.   REVIEW OF SYSTEMS:   Admits abdominal pain, neuropathy in the feet/hands, shoulder pain, hematuria, and hematochezia.  All other systems are negative.    PFSH:  Breast Cancer Survivor - double mastectomy.   Fhx of cholecystectomy     TOBACCO USE:  Social History     Tobacco Use   Smoking Status Never Smoker   Smokeless Tobacco Never Used       VITALS:  Vitals:    01/24/20 1556   BP: 132/86   Patient Site: Left Arm   Patient Position: Sitting   Cuff Size: Adult Large   Pulse: 82   SpO2: 98%   Height: 5' 6\" (1.676 m)     Wt Readings from Last 3 Encounters:   11/15/16 (!) 233 lb (105.7 kg)   12/04/15 (!) 233 lb (105.7 kg)   10/28/15 (!) 233 lb 4.8 oz (105.8 kg)     Body mass index is 37.61 kg/m .    PHYSICAL EXAM:  Constitutional:  " Reveals alert, fast talking, anxious woman. Affect appropriate.  Vitals:  Per nursing notes.  Cardiac:Legs without edema. Palpation of the radial pulse regular.  Neurologic:  Cranial nerves II-XII intact.     Psychiatric:  Mood appropriate, memory intact.        MEDICATIONS:  Current Outpatient Medications   Medication Sig Dispense Refill     celecoxib (CELEBREX) 200 MG capsule Take 1 capsule (200 mg total) by mouth 2 (two) times a day as needed. 90 capsule 3     cholecalciferol, vitamin D3, 5,000 unit Tab Take 4,000 Units by mouth daily.        cyanocobalamin 125 mcg Take 250 mcg by mouth daily.       LORazepam (ATIVAN) 1 MG tablet Take 1 mg by mouth daily as needed.       venlafaxine (EFFEXOR-XR) 75 MG 24 hr capsule Take 1 capsule (75 mg total) by mouth daily. 90 capsule 3     Current Facility-Administered Medications   Medication Dose Route Frequency Provider Last Rate Last Dose     cyanocobalamin injection 1,000 mcg  1,000 mcg Intramuscular Q30 Days Gino Mensah MD   1,000 mcg at 01/24/20 1649       ADDITIONAL HISTORY SUMMARIZED (2): Reviewed 10/7/2019 Colonoscopy report.  DECISION TO OBTAIN EXTRA INFORMATION (1): Care Everywhere Accessed.   RADIOLOGY TESTS (1): Reviewed 11/20/2012 [place] Hepatobiliary   Reviewed US Abdomen 11/20/2012  LABS (1): Reviewed and ordered.  MEDICINE TESTS (1): None.  INDEPENDENT REVIEW (2 each): None.     The visit lasted a total of 40 minutes face to face with the patient. Over 50% of the time was spent counseling and educating the patient about .    IMick, am scribing for and in the presence of, Dr. Mensah.    IDr. Mensah, personally performed the services described in this documentation, as scribed by Mick Beavers in my presence, and it is both accurate and complete.    Total data points: 5

## 2021-06-06 NOTE — ANESTHESIA PREPROCEDURE EVALUATION
Anesthesia Evaluation      No history of anesthetic complications     Airway   Mallampati: II  Neck ROM: full   Pulmonary - negative ROS    breath sounds clear to auscultation                         Cardiovascular - negative ROS  Exercise tolerance: > or = 4 METS  Rhythm: regular  Rate: normal,         Neuro/Psych - negative ROS     Endo/Other    (+) arthritis, obesity (BMI 37),      GI/Hepatic/Renal    (+) GERD,       Comments: Cholelithiasis, s/f lap susana          Dental - normal exam                        Anesthesia Plan  Planned anesthetic: general endotracheal  GETA  Toradol 15mg at case closure if cleared by surgeon   Decadron 10, Zofran 4 for antiemesis  ASA 2   Induction: intravenous   Anesthetic plan and risks discussed with: patient  Anesthesia plan special considerations: antiemetics,   Post-op plan: routine recovery

## 2021-06-06 NOTE — ANESTHESIA POSTPROCEDURE EVALUATION
Patient: Victorina Goldman  Procedure(s):  LAPAROSCOPIC CHOLECYSTECTOMY  Anesthesia type: general    Patient location: PACU  Last vitals:   Vitals Value Taken Time   /66 2/19/2020  5:00 PM   Temp 36.5  C (97.7  F) 2/19/2020  5:00 PM   Pulse 86 2/19/2020  5:00 PM   Resp 24 2/19/2020  2:30 PM   SpO2 95 % 2/19/2020  5:00 PM     Post vital signs: stable  Level of consciousness: awake and responds to simple questions  Post-anesthesia pain: pain controlled  Post-anesthesia nausea and vomiting: no  Pulmonary: unassisted, return to baseline  Cardiovascular: stable and blood pressure at baseline  Hydration: adequate  Anesthetic events: no    QCDR Measures:  ASA# 11 - Gabriella-op Cardiac Arrest: ASA11B - Patient did NOT experience unanticipated cardiac arrest  ASA# 12 - Gabriella-op Mortality Rate: ASA12B - Patient did NOT die  ASA# 13 - PACU Re-Intubation Rate: ASA13B - Patient did NOT require a new airway mgmt  ASA# 10 - Composite Anes Safety: ASA10A - No serious adverse event    Additional Notes:

## 2021-06-06 NOTE — ANESTHESIA CARE TRANSFER NOTE
Last vitals:   Vitals:    02/19/20 1322   BP: 148/70   Pulse: 90   Resp: 20   Temp: 35.9  C (96.7  F)   SpO2: 95%     Patient's level of consciousness is awake  Spontaneous respirations: yes  Maintains airway independently: yes  Dentition unchanged: yes  Oropharynx: oropharynx clear of all foreign objects    QCDR Measures:  ASA# 20 - Surgical Safety Checklist: WHO surgical safety checklist completed prior to induction    PQRS# 430 - Adult PONV Prevention: 4558F - Pt received => 2 anti-emetic agents (different classes) preop & intraop  ASA# 8 - Peds PONV Prevention: NA - Not pediatric patient, not GA or 2 or more risk factors NOT present  PQRS# 424 - Gabriella-op Temp Management: 4559F - At least one body temp DOCUMENTED => 35.5C or 95.9F within required timeframe  PQRS# 426 - PACU Transfer Protocol: - Transfer of care checklist used  ASA# 14 - Acute Post-op Pain: ASA14B - Patient did NOT experience pain >= 7 out of 10

## 2021-06-07 NOTE — TELEPHONE ENCOUNTER
Patient Returning Call  Reason for call:  Cologuard  Information relayed to patient:  Did patient do Cologuard?  Shawn stated yes and the result was negative.    Patient has additional questions:  No  If YES, what are your questions/concerns:  NA  Okay to leave a detailed message?: No call back needed

## 2021-06-07 NOTE — TELEPHONE ENCOUNTER
Medication Request  Medication name:    Disp  Refills  Start  End     celecoxib (CELEBREX) 200 MG capsule  90 capsule  3  4/8/2020      Sig - Route: Take 1 capsule (200 mg total) by mouth 2 (two) times a day as needed. - Oral     Sent to pharmacy as: celecoxib 200 mg capsule (CeleBREX)     E-Prescribing Status: Receipt confirmed by pharmacy (4/8/2020  9:23 AM CDT)         Requested Pharmacy: [facility]  Reason for request: caller stated that patient's insurance is not covering this medication and was wondering if Gino Mensah MD would like to send over alternatives.   When did you use medication last?:    Patient offered appointment:  patient declined  Okay to leave a detailed message: yes  [phone]   Ref #[id number]

## 2021-06-07 NOTE — TELEPHONE ENCOUNTER
I have forwarded the initial Telephone Encounter dated 04/14/2020 that mentions the need for a PA to [facility].  Please check that encounter for updated on the PA.

## 2021-06-07 NOTE — TELEPHONE ENCOUNTER
LVTCB.  A few months ago patient should have received a cologard kit in the mail.  Did she get it?  If so, can she complete and send it out soon?  Or do we need to send her a new one?  Please advise.

## 2021-06-07 NOTE — TELEPHONE ENCOUNTER
Central PA team  [phone]  Pool: [facility] ([id number])          PA has been initiated.       PA form completed and faxed insurance via Cover My Meds     Frankel:  BASIA GONZALES (Key: [id number])     Medication:  celecoxib (CELEBREX) 200 MG capsule     Insurance:  [employer]        Response will be received via fax and may take up to 5-10 business days depending on plan

## 2021-06-10 NOTE — PROGRESS NOTES
Patient would like the video invitation sent by: Other e-mail: [email or web address]       ASSESSMENT:  1. Neuropathy  Trial of low-dose gabapentin due to success of B12 shot  - gabapentin (NEURONTIN) 100 MG capsule; Take 100 mg by mouth 2 (two) times a day.  Dispense: 60 capsule; Refill: 11    2. S/P laparoscopic cholecystectomy  Reviewed gallstones, referral, and successful laparoscopic cholecystectomy    3. Rotator cuff syndrome, right  Begin with home exercises    4. Primary breast infiltrating ductal carcinoma, right (H)  Stable after diagnosis and treatment 10 years ago    5. Morbid obesity (H)  Encouraged exercise      Preventive Health Care: Up-to-date    PLAN:  Patient Instructions   Gabapentin 100 mg twice daily and adjust as needed    If side effects, proceed with home vitamin B12 shots    Begin home physical therapy based on Internet shoulder exercises    Medicine list updated    Health maintenance updated    No orders of the defined types were placed in this encounter.    Return in about 6 months (around 1/29/2021).      CHIEF COMPLAINT:  Chief Complaint   Patient presents with     Follow-up       HISTORY OF PRESENT ILLNESS:  Victorina is a 59 y.o. female contacting the clinic today via video for review of neuropathy.  A B12 shot in January helped for 2 to 3 weeks and she wonders whether she can try home B12 shot.  Etiology of the neuropathy is presumed to be chemo.  She has never tried oral medication    Symptoms described in January seemed suggestive of gallstones.  Gallbladder ultrasound showed gallstones and she was referred to Dr. Augusto Apodaca of general surgery.  Laparoscopic cholecystectomy was performed on February 19 without complication.  Most of her upper gastrointestinal symptoms have now completely resolved    She feels her mood is stable    REVIEW OF SYSTEMS:   Right shoulder rotator cuff pain.  Good sleep.  All other systems are negative.    PFSH:  Social History     Social History Narrative     She is single and works part-time for the [employer].  She does not smoke cigarettes and rarely drinks alcohol.     Used to take care of her father    TOBACCO USE:  Social History     Tobacco Use   Smoking Status Never Smoker   Smokeless Tobacco Never Used       VITALS:  There were no vitals filed for this visit.  Wt Readings from Last 3 Encounters:   02/19/20 (!) 244 lb 2 oz (110.7 kg)   11/15/16 (!) 233 lb (105.7 kg)   12/04/15 (!) 233 lb (105.7 kg)       PHYSICAL EXAM:  (observations via Video)  Calm.  Reclining on couch.  Poor video      ADDITIONAL HISTORY SUMMARIZED (2): Reviewed surgical notes, surgery, and patient messages  CARE EVERYWHERE/ EXTRA INFORMATION (1):   RADIOLOGY TESTS (1): Gallbladder ultrasound showing gallstones  LABS (1): Up-to-date including B12 level  CARDIOLOGY/MEDICINE TESTS (1):   INDEPENDENT REVIEW (2 each):     Total data points: 4    Video Start time: 2:32 PM  Video End time: 2:49 PM    The visit lasted a total of 17 minutes     CA Intake Time: 3:30 min    I have reviewed and updated the patient's Past Medical History, Social History, Family History as appropriate.    MEDICATIONS: Reviewed and updated per CA and MD  Current Outpatient Medications   Medication Sig Dispense Refill     celecoxib (CELEBREX) 200 MG capsule Take 1 capsule (200 mg total) by mouth 2 (two) times a day as needed. 90 capsule 3     cholecalciferol, vitamin D3, 5,000 unit Tab Take 4,000 Units by mouth daily.        cyanocobalamin 125 mcg Take 250 mcg by mouth daily.       gabapentin (NEURONTIN) 100 MG capsule Take 100 mg by mouth 2 (two) times a day. 60 capsule 11     LORazepam (ATIVAN) 1 MG tablet Take 1 mg by mouth daily as needed.       meloxicam (MOBIC) 7.5 MG tablet Take 1 tablet (7.5 mg total) by mouth 2 (two) times a day. 60 tablet 11     venlafaxine (EFFEXOR-XR) 75 MG 24 hr capsule Take 1 capsule (75 mg total) by mouth daily. 90 capsule 3     Current Facility-Administered Medications   Medication Dose  "Route Frequency Provider Last Rate Last Dose     cyanocobalamin injection 1,000 mcg  1,000 mcg Intramuscular Q30 Days Gino Mensah MD   1,000 mcg at 01/24/20 3194         The patient has been notified of following:     \"This video visit will be conducted via a call between you and your physician/provider. We have found that certain health care needs can be provided without the need for an in-person physical exam.  This service lets us provide the care you need with a video conversation.  If a prescription is necessary we can send it directly to your pharmacy.  If lab work is needed we can place an order for that and you can then stop by our lab to have the test done at a later time.    Video visits are billed at different rates depending on your insurance coverage. Please reach out to your insurance provider with any questions.    If during the course of the call the physician/provider feels a video visit is not appropriate, you will not be charged for this service.\"    Patient has given verbal consent to a Video visit? Yes  How would you like to obtain your AVS? AVS Preference: MyChart.  If dropped by the video visit, the video invitation should be sent to: Text to cell phone: [phone]  Will anyone else be joining your video visit? No     Video-Visit Details    Type of service:  Video Visit    Originating Location (pt. Location): Home    Distant Location (provider location):  [facility]     Platform used for Video Visit: Laurel Mensah MD     "

## 2021-06-10 NOTE — TELEPHONE ENCOUNTER
Prior Authorization Request  Who s requesting:  Pharmacy  Pharmacy Name and Location:   [id number]  [phone]  Medication Name:   gabapentin (NEURONTIN) 100 MG capsule   120 Tablets per month  Insurance Plan:   [employer] Out of State  Insurance Member ID Number:    ID# [id number]  CoverMyMeds Key: N/A  Informed patient that prior authorizations can take up to 10 business days for response:   Yes  Okay to leave a detailed message: Yes

## 2021-06-10 NOTE — PROGRESS NOTES
HISTORY OF PRESENT ILLNESS  Asked to see by Dr. Mensah for evaluation of nosebleeds. Patient reports that she has been having nosebleeds on the left. Going on for several years. She has tried Vaseline-type ointment with some success. Occurring several times a week. In the Winter several times a day. No prior history of nasal trauma or surgery.     REVIEW OF SYSTEMS  Review of Systems: a 10-system review was performed. Pertinent positives are noted in the HPI and on a separate scanned document in the chart.    PMH, PSH, FH and SH has documented in the EHR.      EXAM    CONSTITUTIONAL  General Appearance:  Normal, well developed, well nourished, no obvious distress  Ability to Communicate:  communicates appropriately.    HEAD AND FACE  Appearance and Symmetry:  Normal, no scalp or facial scarring or suspicious lesions.  Paranasal sinuses tenderness:  Normal, Paranasal sinuses non tender    EARS  Clinical speech reception threshold:  Normal, able to hear normal speech.  Auricle:  Normal, Auricles without scars, lesions, masses.  External auditory canal:  Normal, External auditory canal normal.  Tympanic membrane:  Normal, Tympanic membranes normal without swelling or erythema.    NOSE (speculum or scope)  Architecture:  Normal, Grossly normal external nasal architecture with no masses or lesions.  Mucosa:  Small granuloma left anterior septum.  Septum:  Normal, Septum non-obstructing.  Turbinates:  Normal, No turbinate abnormalities    ORAL CAVITY AND OROPHARYNX  Lips:  Normal.  Dental and gingiva:  Normal, No obvious dental or gingival disease.  Mucosa:  Normal, Moist mucous membranes.  Tongue:  Normal, Tongue mobile with no mucosal abnormalities  Hard and soft palate:  Normal, Hard and soft palate without cleft or mucosal lesions.  Oral pharynx:  Normal, Posterior pharynx without lesions or remarkable asymmetry.  Saliva:  Normal, Clear saliva.  Masses:  Normal, No palpable masses or pathologically enlarged lymph  nodes.    NECK  Masses/lymph nodes:  Normal, No worrisome neck masses or lymph nodes.  Salivary glands:  Normal, Parotid and submandibular glands.  Trachea and larynx position:  Normal, Trachea and larynx midline.  Thyroid:  Normal, No thyroid abnormality.  Tenderness:  Normal, No cervical tenderness.  Suppleness:  Normal, Neck supple    NEUROLOGICAL  Speech pattern:  Normal, Proasaic    RESPIRATORY  Symmetry and Respiratory effort:  Normal, Symmetric chest movement and expansion with no increased intercostal retractions or use of accessory muscles.     IMPRESSION  Recurrent epistaxis.    RECOMMENDATION    EPISTAXIS TREATMENT  The left nose was infiltrated with lidocaine plus epinephrine solution.  After allowing adequate time for anesthesia the area in questions was treated with topical silver nitrate.  The patient tolerated the procedure without difficulty.  The patient was instructed to apply Bacitracin, Neosporin or other topical antibiotic to the anterior septum twice a day for 2 weeks to prevent infection and crusting.    Armand Becker MD

## 2021-06-10 NOTE — PATIENT INSTRUCTIONS - HE
Gabapentin 100 mg twice daily and adjust as needed    If side effects, proceed with home vitamin B12 shots    Begin home physical therapy based on Internet shoulder exercises    Medicine list updated    Health maintenance updated

## 2021-06-11 NOTE — TELEPHONE ENCOUNTER
Central PA team  [phone]  Pool: HE PA MED ([id number])          PA has been initiated.       PA form completed and faxed insurance via Cover My Meds     Key:  [id number] - PA Case ID: [id number]     Medication:  Gabapentin 100MG capsules    Insurance:  [employer]         Response will be received via fax and may take up to 5-10 business days depending on plan

## 2021-06-12 NOTE — TELEPHONE ENCOUNTER
Received MTM referral from clinic     Patient was not reachable after several attempts, will route to MTM Pharmacist/Provider as an FYI. Left MTM scheduling information on patients voicemail.    Thank you for the referral,    Philip Denny, MTM coordinator

## 2021-06-13 NOTE — PROGRESS NOTES
MTM Initial Encounter  Assessment & Plan                                                     1. Chemotherapy-induced neuropathy (H)  Somewhat poorly controlled at this time, with breakthrough symptoms despite addition of gabapentin.  Reviewed the benefits versus risks of chemotherapy induced neuropathy regimens, per NCCN guidelines.  She has been successfully treated for anxiety historically with SNRI, venlafaxine, however she may benefit from directly switching to an alternative SNRI duloxetine.  Duloxetine is additionally in osteoarthritis guidelines, and due to arthritis in her ankles she may benefit both for arthritis and neuropathy.  Recommend to switch directly from venlafaxine to duloxetine 60 mg daily, guideline recommended dose for osteoarthritis.  Expect very low risk for withdrawal and or adverse effects.  Recommend to continue on gabapentin at this time, however after several weeks on duloxetine and will likely trial tapering or discontinuing gabapentin.  Gabapentin may also be beneficial when used on an as-needed basis, which she finds appealing.  Recommend to continue B12 supplement, however encouraged doubling of her current dose.  The sublingual tablets may mimic the effects of injectable B12 more readily than orally absorbed formulations.  Recommend against alternative guideline recommendations of tricyclic antidepressants for concerns of weight gain, which is a similar concern for gabapentin.  Although not not currently recommended in the guidelines, an alternative agent such as topiramate may be considered, however risk of hair loss and memory issues may be concerning, this particular regimen was not discussed today.  -Switch venlafaxine to DULoxetine (CYMBALTA) 60 MG capsule; Take 1 capsule (60 mg total) by mouth daily.  Dispense: 30 capsule; Refill: 1  -Increase B12 to 2 tabs daily    2. Anxiety  Stable, however as discussed above no benefit monitoring control of anxiety symptoms through  rotation of the SNRI.    3. Arthritis  Stable, however generally exacerbated during the holiday season.  Tylenol is first-line therapy in osteoarthritis guidelines, therefore recommend to initiate Tylenol 1000 mg before activity to help prevent exacerbation of arthritis pain, likely minimizing need for Celebrex.  Additionally discussed above switching to duloxetine will likely also help with arthritis.   -Initiate Tylenol 1000 mg before activity    Follow Up  Return in about 2 weeks (around 12/4/2020) for Page Alves, PharmD, BCACP, By phone.    Subjective & Objective                                                     Victorina Goldman is a 60 y.o. female called for an initial visit for Medication Therapy Management. She was referred to me from Gino Mensah MD.     Patient consented to a telehealth visit: Yes    Reason for visit: Medication management initial    Medication Adherence/Access: Stable, no issues identified.  She prefers not to take medications if she does not need to.    Chemotherapy induced neuropathy: After doctoring with Dr. Mensah, identified that her foot pain may be related to nerves as she had no significant improvement in her pain after an injection of B12.  She now continues on B12 orally, as she has not been able to regularly get injections.  She prefers not to do B12 injections on her own on a regular basis.  She has continued on venlafaxine historically as discussed below for anxiety, but would be open to switching to an alternative if better for pain.  She would like to stick to the least amount of medicines possible.  She did start gabapentin, and found little benefit on 100 mg dose.  As discussed with Dr. Mensah she would titrate to 300 mg, and has not noticed significant improvement in nerve pain either.  She does however note a slight drowsy feeling after dosing.    Anxiety: Notes that she initially started venlafaxine for hot flashes, in addition to her anxiety.  She  "finds that this does work very well for her.  She will be open to switching to another medication in this class if she can expect similar benefits the more efficacy with regards to her pain.  PHQ-9 Total Score: 0 (7/29/2020  2:05 PM)    Arthritis: Notes that she has a \"bone-on-bone arthritis\" in her feet bilaterally.  This seems to be worse during the holiday seasons when she is largely up on her feet most of the day.  Notes that the pain is \"not bad\" but feels aching and throbbing.  It feels as though there is a \"hair wrapped around her toes and pinching.\"  She generally comes home from work and takes Celebrex, she uses this very much so on an as-needed basis.  At times during normal working seasons she does not take this for several months at a time.  She had never used Tylenol in the past, and was wondering if this would even work because of the need to treat for inflammation.    PMH: reviewed in EPIC   Allergies/ADRs: reviewed in EPIC   Alcohol: None   Tobacco:   Social History     Tobacco Use   Smoking Status Never Smoker   Smokeless Tobacco Never Used     Today's Vitals: There were no vitals filed for this visit.  ----------------    The patient declined an after visit summary    I spent 30 minutes with this patient today.   All changes were made via collaborative practice agreement with Gino Mensah MD. A copy of the visit note was provided to the patient's provider.     Eddie Alves, PharmD, BCACP  Medication Management (MTM) Pharmacist  [facility]    Telemedicine Visit Details    Type of service:  Telephone     Start Time: 10AM  End Time: 10:30AM    Originating Location (pt. Location): Home    Distant Location (provider location):  [place] MED[facility] THERAPY MANAGEMENT [facility]    Mode of Communication: Telephone    Current Outpatient Medications   Medication Sig Dispense Refill     acetaminophen (TYLENOL) 500 MG tablet Take 500-1,000 mg by " mouth 3 (three) times a day as needed.       celecoxib (CELEBREX) 200 MG capsule Take 1 capsule (200 mg total) by mouth 2 (two) times a day as needed. 90 capsule 3     cyanocobalamin, vitamin B-12, 1,000 mcg Subl Place 2,000 mcg under the tongue daily.       gabapentin (NEURONTIN) 300 MG capsule Take 1 capsule (300 mg total) by mouth 2 (two) times a day. 60 capsule 11     LORazepam (ATIVAN) 1 MG tablet Take 1 mg by mouth as needed for anxiety. For dental appointments       DULoxetine (CYMBALTA) 60 MG capsule Take 1 capsule (60 mg total) by mouth daily. 30 capsule 1     Current Facility-Administered Medications   Medication Dose Route Frequency Provider Last Rate Last Admin     cyanocobalamin injection 1,000 mcg  1,000 mcg Intramuscular Q30 Days Gino Mensah MD   1,000 mcg at 01/24/20 4530

## 2021-06-13 NOTE — PROGRESS NOTES
MTM Follow Up Encounter  Assessment & Plan                                                       1. Chemotherapy-induced neuropathy (H)  Stable, however with ongoing symptoms due to being on her feet significantly more due to work during the holiday season.  She has tolerated switch from venlafaxine to duloxetine without adverse effects, and with slight improvement in her energy levels during the day.  She would like to continue on his current medication and reassess in January once her activity returns back to normal.  She continues on gabapentin and increased her B12 supplement as previously directed.  Recommend to continue current regimen and reassess in 6 weeks per patient request.     2. Anxiety  Stable, tolerating switch from venlafaxine to duloxetine without adverse effects, recommend to continue current regimen and evaluate CATHY-7 at follow-up.    3. Arthritis  Stable, however generally exacerbated during the holiday season.  Tylenol is first-line therapy in osteoarthritis guidelines, and she is taking essentially a subtherapeutic dose based on indication.  Recommend to increase Tylenol to 1000 mg 3 times daily while she is working to help prevent pain.  Due to significant increase in activity is difficult to assess whether duloxetine is also helping with her osteoarthritis, recommend reassess at follow-up.  -Titrate Tylenol to 1000 mg 3 times daily    Follow Up  Return in about 7 weeks (around 1/22/2021) for Page Alves, PharmD, BCACP, By phone.    Subjective & Objective                                                       Victorina Goldman is a 60 y.o. female called for a follow up visit for Medication Therapy Management. She was referred to me from Gino Mensah MD. She is very busy at work and excited about of the toys the [employer] will be passing out this holiday season.    Patient consented to a telehealth visit: Yes    Reason for visit: Medication Management Follow up     Medication  "Adherence/Access: Stable, no issues identified.    Chemotherapy-induced neuropathy: Follow-up to previous appointment where venlafaxine was switched to duloxetine for chemotherapy induced neuropathy.  Notes that for the first few days she was feeling a little bit jittery, however this resolved within the first few days.  She is not feeling anxious or having any difficulties with sleep, believes she is not experiencing any ill effects from duloxetine.  She does however find that she has a little bit more energy and not so groggy during the day.  She continues on gabapentin twice daily.  She also increased her B12 to 2 tablets daily.  She feels that it is likely difficult to assess the full benefit of these medication changes that she since she is walking and on her feet a lot more resulting in more foot pain.  Notes that the \"pain and throbbing ache\" in her feet is tolerable, and would like to follow-up again at the end of January to see how she is doing once the busy holiday season is over.    Anxiety: Despite switch from venlafaxine to duloxetine feels that her anxiety and hot flashes are well managed.    Arthritis: She has taken Tylenol 5 have milligrams 1-2 times daily on her workdays when she is on her feet most of the day.  She has found a slight benefit from Tylenol, however wanted to wait until she talked with me again before discussing whether she should increase her dose.    PMH: reviewed in EPIC   Allergies/ADRs: reviewed in EPIC   Alcohol: None  Tobacco:   Social History     Tobacco Use   Smoking Status Never Smoker   Smokeless Tobacco Never Used     Today's Vitals:   BP Readings from Last 3 Encounters:   02/19/20 135/66   01/24/20 132/86   06/11/19 122/80   ----------------    The patient declined an after visit summary    I spent 15 minutes with this patient today;   All changes were made via collaborative practice agreement with Gino Mensah MD. A copy of the visit note was provided to the " patient's provider.     Eddie Alves, PharmD, BCACP  Medication Management (MTM) Pharmacist  [facility]    Telemedicine Visit Details    Type of service:  Telephone     Start Time: 3:30PM  End Time: 3:45PM    Originating Location (pt. Location): Home    Distant Location (provider location):  [place] MEDICATION THERAPY MANAGEMENT [facility]    Mode of Communication: Telephone   Medication Therapy Recommendations Needing Review  Arthritis    Current Medication: acetaminophen (TYLENOL) 500 MG tablet   Rationale: Dose too low - Dosage too low - Effectiveness   Recommendation: Increase Dose - Increase Tylenol to 1000 mg 3 times daily   Status: Accepted - no CPA Needed            Current Outpatient Medications   Medication Sig Dispense Refill     acetaminophen (TYLENOL) 500 MG tablet Take 500-1,000 mg by mouth 3 (three) times a day as needed.       celecoxib (CELEBREX) 200 MG capsule Take 1 capsule (200 mg total) by mouth 2 (two) times a day as needed. 90 capsule 3     cyanocobalamin, vitamin B-12, 1,000 mcg Subl Place 2,000 mcg under the tongue daily.       DULoxetine (CYMBALTA) 60 MG capsule Take 1 capsule (60 mg total) by mouth daily. 90 capsule 2     gabapentin (NEURONTIN) 300 MG capsule Take 1 capsule (300 mg total) by mouth 2 (two) times a day. 60 capsule 11     LORazepam (ATIVAN) 1 MG tablet Take 1 mg by mouth as needed for anxiety. For dental appointments       Current Facility-Administered Medications   Medication Dose Route Frequency Provider Last Rate Last Admin     cyanocobalamin injection 1,000 mcg  1,000 mcg Intramuscular Q30 Days Gino Mensah MD   1,000 mcg at 01/24/20 1364

## 2021-06-14 NOTE — PROGRESS NOTES
"Medication Therapy Management (MTM) Encounter    Assessment:                                                      1. Chemotherapy-induced neuropathy (H)  Improved after switch from venlafaxine to duloxetine, however this has resulted in breakthrough anxiety and nocturnal vasomotor symptoms.  Consider conservative dose decrease in duloxetine to assess whether this improves anxiety and maintains control of neuropathy in her feet.  It is unlikely that this will have a positive benefit on her vasomotor symptoms.  However identified that she is no longer taking gabapentin, encouraged her to resume gabapentin in the evening, to help with neuropathy as well as vasomotor symptoms.  Reassess symptoms in 1 week via [employer].  Recommend continue B12 supplement as directed.    2. Anxiety  Slightly works after transitioning from venlafaxine to duloxetine. Consider adjustments as above, however if anxiety does not improve will transition back to venlafaxine.     3. Arthritis  Stable, now only requiring as needed use to tylenol and celebrex.     4. Vitamin D deficiency  Stable. Recommended to continue current regimen.     Plan:                                                     1. Decrease duloxetine to 40mg QAM   2. Resume gabapentin at bedtime     Follow Up  Return in about 1 week (around 1/29/2021) for Page Alves, PharmD, BCACP, via [employer].    Subjective & Objective                                                     Victorina Goldman is a 60 y.o. female called for an initial visit for Medication Therapy Management. She was referred to me from Gino Mensah MD.     Reason for visit: Medication management initial    Medication Adherence/Access: tries to minimize meds.     Chemotherapy induced neuropathy: Notes that after switching from venlafaxine to duloxetine to 60 mg daily, her feet are better which is \"a very good thing.\"  But on the other hand she is now experiencing more hot flashes at night which is making it " "difficult to sleep.  She is not sleeping as soundly.  She has also noticed that she is more anxious, and her stomach seems to be bothering her a little bit more.  She feels jittery, more intense, almost a \"on guard feeling.\"  However since she has been on this medication she is not sweating as much as she had been previously.  She sees positives and negatives with duloxetine, however if her anxiety and hot sweats do not improve she will have to switch back to venlafaxine.    Anxiety: As discussed above her anxiety is a little bit worse now on duloxetine as opposed to venlafaxine.    Arthritis: After her busy holiday season at work she has tried to cut back on Tylenol as quick as she could.  She has not taken any Tylenol nor Celebrex since New Year's.    Vitamin D deficiency: continues on vitamin D daily.    Vitamin D, Total (25-Hydroxy)   Date Value Ref Range Status   06/11/2019 32.7 30.0 - 80.0 ng/mL Final     PMH: reviewed in EPIC   Allergies/ADRs: reviewed in EPIC   Alcohol: None   Tobacco:   Social History     Tobacco Use   Smoking Status Never Smoker   Smokeless Tobacco Never Used     Today's Vitals:   BP Readings from Last 3 Encounters:   02/19/20 135/66   01/24/20 132/86   06/11/19 122/80   ----------------  The patient declined an after visit summary    I spent 20 minutes with this patient today.   All changes were made via collaborative practice agreement with Gino Mensah MD. A copy of the visit note was provided to the patient's provider.     Eddie Alves, PharmD, BCACP  Medication Management (MTM) Pharmacist  [facility]    Telemedicine Visit Details    Type of service:  Telephone     Start Time: 3:30PM  End Time: 3:50PM    Originating Location (pt. Location): Home    Distant Location (provider location):  [place] [facility]AP[facility]NAG[facility]    Mode of Communication: Telephone    Current Outpatient Medications   Medication Sig Dispense Refill     " acetaminophen (TYLENOL) 500 MG tablet Take 500-1,000 mg by mouth 3 (three) times a day as needed.       celecoxib (CELEBREX) 200 MG capsule Take 1 capsule (200 mg total) by mouth 2 (two) times a day as needed. 90 capsule 3     cholecalciferol, vitamin D3, 1,000 unit (25 mcg) tablet Take 2,000 Units by mouth daily.       cyanocobalamin, vitamin B-12, 1,000 mcg Subl Place 2,000 mcg under the tongue daily.       DULoxetine (CYMBALTA) 20 MG capsule Take 2 capsules (40 mg total) by mouth every morning. 60 capsule 0     gabapentin (NEURONTIN) 300 MG capsule Take 1 capsule (300 mg total) by mouth at bedtime. 30 capsule 11     LORazepam (ATIVAN) 1 MG tablet Take 1 mg by mouth as needed for anxiety. For dental appointments        Current Facility-Administered Medications   Medication Dose Route Frequency Provider Last Rate Last Admin     cyanocobalamin injection 1,000 mcg  1,000 mcg Intramuscular Q30 Days Gino Mensah MD   1,000 mcg at 01/24/20 9012        Medication Therapy Recommendations  Chemotherapy-induced neuropathy (H)    Current Medication: DULoxetine (CYMBALTA) 60 MG capsule (Discontinued)   Rationale: Dose too high - Dosage too high - Safety   Recommendation: Decrease Dose - decrease duloxetine to 40mg QAM   Status: Accepted per CPA          Current Medication: gabapentin (NEURONTIN) 300 MG capsule (Discontinued)   Rationale: Does not understand instructions - Adherence - Adherence   Recommendation: Provide Education - resume gabapentin 300mg QHS   Status: Accepted per CPA

## 2021-06-16 PROBLEM — E66.01 MORBID OBESITY (H): Status: ACTIVE | Noted: 2020-07-29

## 2021-06-16 PROBLEM — I89.0 LYMPHEDEMA OF RIGHT ARM: Status: ACTIVE | Noted: 2020-01-24

## 2021-06-16 PROBLEM — Z90.49 S/P LAPAROSCOPIC CHOLECYSTECTOMY: Status: ACTIVE | Noted: 2020-02-19

## 2021-06-17 NOTE — TELEPHONE ENCOUNTER
Telephone Encounter by Osiel Weiss at 4/28/2020  1:13 PM     Author: Osiel Weiss Service: -- Author Type: --    Filed: 4/28/2020  1:18 PM Encounter Date: 4/14/2020 Status: Signed    : Osiel Weiss APPROVED:    Approval start date: 03/29/2020  Approval end date:  04/28/2021    I called and left a voicemail for the patient at this number: [phone] (H) to let her know of the approval under her pharmacy benefits and that she may contact her mail order pharmacy at her convenience to arrange delivery. I left my direct number should she have further questions.

## 2021-06-17 NOTE — PROGRESS NOTES
Chief complaint: Here for a physical    History of present illness:   Victorina is in today for general physical and Pap smear.  She is doing okay but feels as though she has a rash, thinks it could maybe be from the Effexor.  She is hesitant on redoing a colonoscopy in a couple years when do as the last time she thinks that she was without her Effexor and somehow it caused some extensive depressive symptoms and she would want to come up with an alternative plan to deal with that.    Social history:   Social History     Social History Narrative    She is single and works part-time for the [employer].  She does not smoke cigarettes and rarely drinks alcohol.       Family history:    Family History   Problem Relation Age of Onset     Dementia Father      Heart disease Father       age 82     Other Mother       age 74 of pneumonia       Review of systems:  The rest of the review of systems are negative for all systems.    Current allergies, medications, and problem list are all reviewed and updated in the chart.    Physical exam:  Vitals:    18 1411   BP: 124/78   Patient Site: Left Arm   Patient Position: Sitting   Cuff Size: Adult Large   Pulse: 72     There is no height or weight on file to calculate BMI.  General Appearance:  Alert, cooperative, no distress, appears stated age   Head:  Normocephalic, without obvious abnormality, atraumatic   Eyes:  PERRL, conjunctiva/corneas clear, EOM's intact   Ears:  Normal TM's and external ear canals, both ears   Nose: Nares normal, no drainage    Throat: Lips, mucosa, and tongue normal; teeth and gums normal   Neck: Supple, symmetrical, trachea midline, no adenopathy;  thyroid: not enlarged, symmetric, no tenderness/mass/nodules; no carotid bruit   Back:   Symmetric, no curvature, ROM normal   Lungs:   Clear to auscultation bilaterally, respirations unlabored   Breasts:  No breast masses, tenderness, asymmetry, or nipple discharge.   Heart:  Regular rate and  rhythm, S1 and S2 normal, no murmur, rub, or gallop   Abdomen:   Soft, non-tender, bowel sounds active, no masses, no organomegaly   Genitalia: Normally developed genitalia with no external lesions or eruptions.  Vagina and cervix show no lesions, inflammation, discharge or tenderness.  No cystocele.  Uterus normal size and position.  No adnexal mass or tenderness.   Rectal:  No hemorrhoids   Extremities: Extremities normal, atraumatic, no cyanosis or edema   Skin: Skin color, texture, turgor normal, no rashes or lesions   Lymph nodes: Cervical, supraclavicular, and axillary nodes normal   Neurologic: Nonfocal with facial symmetry      Assessment and plan:  1. Health care maintenance  Pap smear is done today.  She does not get mammograms secondary to bilateral mastectomies.  Colonoscopy was in 2010.  - Lipid Cascade  - Urinalysis-UC if Indicated  - Gynecologic Cytology (PAP Smear)    2. Iron deficiency anemia secondary to inadequate dietary iron intake  Check labs.  - Basic Metabolic Panel  - HM2(CBC w/o Differential)  - Thyroid Stimulating Hormone (TSH)  - Hepatic Profile    3. Menopause      4. Vitamin D deficiency    - Vitamin D, Total (25-Hydroxy)            April DTrish Erwin MD  Internal and Geriatric Medicine  [facility]  4/3/2018    [facility]  Much or all of the text in this note was generated through the use of Dragon Dictate voice-to-text software. Errors in spelling or words which seem out of context are unintentional.  Sound alike errors, in particular, may have escaped editing.

## 2021-06-17 NOTE — TELEPHONE ENCOUNTER
Central PA team  [phone]  Pool: HE PA MED ([id number])          PA has been initiated.       PA form completed and faxed insurance via Cover My Meds     Key:  [id number] - PA Case ID: [id number]     Medication:  Celecoxib 200MG capsules    Insurance:  [facility]         Response will be received via fax and may take up to 5-10 business days depending on plan

## 2021-06-17 NOTE — TELEPHONE ENCOUNTER
Telephone Encounter by Cheri Chacon at 9/2/2020  8:00 AM     Author: Cheri Chacon Service: -- Author Type: --    Filed: 9/2/2020  8:00 AM Encounter Date: 8/27/2020 Status: Signed    : Cheri Chacon APPROVED:    Approval start date: 08/01/2020  Approval end date:  08/31/2021    Pharmacy has been notified of approval and will contact patient when medication is ready for pickup.

## 2021-06-24 NOTE — TELEPHONE ENCOUNTER
Refill Request  Did you contact pharmacy: No  Medication name:   Requested Prescriptions     Pending Prescriptions Disp Refills     venlafaxine (EFFEXOR-XR) 37.5 MG 24 hr capsule 90 capsule 5     Sig: Take 1 capsule (37.5 mg total) by mouth daily.     Who prescribed the medication: Gladys Erwin MD   Pharmacy Name and Location: [facility]  Is patient out of medication: No.  14-20 days left  Patient notified refills processed in 72 hours:  yes  Okay to leave a detailed message: no    This is a new pharmacy. Patient would like a 90 day supply.

## 2021-06-24 NOTE — TELEPHONE ENCOUNTER
Former patient of Rip & has not established care with another provider.  Please assign refill request to covering provider per Clinic standard process.      Refill Approved    Rx renewed per Medication Renewal Policy. Medication was last renewed on 8/6/18.    Juliana Castañeda, [facility] Triage/Med Refill 2/20/2019     Requested Prescriptions   Pending Prescriptions Disp Refills     venlafaxine (EFFEXOR-XR) 37.5 MG 24 hr capsule 90 capsule 5     Sig: Take 1 capsule (37.5 mg total) by mouth daily.    Venlafaxine/Desvenlafaxine Refill Protocol Passed - 2/18/2019  3:01 PM       Passed - LFT or AST or ALT in last year    Albumin   Date Value Ref Range Status   04/03/2018 3.5 3.5 - 5.0 g/dL Final     Bilirubin, Total   Date Value Ref Range Status   04/03/2018 0.3 0.0 - 1.0 mg/dL Final     Bilirubin, Direct   Date Value Ref Range Status   04/03/2018 0.1 <=0.5 mg/dL Final     Alkaline Phosphatase   Date Value Ref Range Status   04/03/2018 134 (H) 45 - 120 U/L Final     AST   Date Value Ref Range Status   04/03/2018 19 0 - 40 U/L Final     ALT   Date Value Ref Range Status   04/03/2018 12 0 - 45 U/L Final     Protein, Total   Date Value Ref Range Status   04/03/2018 7.3 6.0 - 8.0 g/dL Final               Passed - Fasting lipid cascade in last year    Cholesterol   Date Value Ref Range Status   04/03/2018 238 (H) <=199 mg/dL Final     Triglycerides   Date Value Ref Range Status   04/03/2018 182 (H) <=149 mg/dL Final     HDL Cholesterol   Date Value Ref Range Status   04/03/2018 57 >=50 mg/dL Final     LDL Calculated   Date Value Ref Range Status   04/03/2018 145 (H) <=129 mg/dL Final     Patient Fasting > 8hrs?   Date Value Ref Range Status   04/03/2018 No  Final            Passed - PCP or prescribing provider visit in last year    Last office visit with prescriber/PCP: Visit date not found OR same dept: 4/3/2018 Gladys Erwin MD OR same specialty: 4/3/2018 Gladys Erwin MD  Last physical: Visit date  not found Last MTM visit: Visit date not found   Next visit within 3 mo: Visit date not found  Next physical within 3 mo: Visit date not found  Prescriber OR PCP: No Primary Care Provider  Last diagnosis associated with med order: 1. Anxiety  - venlafaxine (EFFEXOR-XR) 37.5 MG 24 hr capsule; Take 1 capsule (37.5 mg total) by mouth daily.  Dispense: 90 capsule; Refill: 5    If protocol passes may refill for 12 months if within 3 months of last provider visit (or a total of 15 months).            Passed - Blood Pressure in last year    BP Readings from Last 1 Encounters:   04/03/18 124/78

## 2021-07-04 NOTE — TELEPHONE ENCOUNTER
Telephone Encounter by Cheri Chacon at 5/26/2021 10:51 AM     Author: Cheri Chacon Service: -- Author Type: --    Filed: 5/26/2021 10:51 AM Encounter Date: 5/20/2021 Status: Signed    : Cheri Chacon APPROVED:    Approval start date: 04/24/2021  Approval end date:  05/24/2022    Pharmacy has been notified of approval and will contact patient when medication is ready for pickup.

## 2021-07-23 ENCOUNTER — OFFICE VISIT (OUTPATIENT)
Dept: INTERNAL MEDICINE | Facility: CLINIC | Age: 61
End: 2021-07-23
Payer: COMMERCIAL

## 2021-07-23 VITALS
SYSTOLIC BLOOD PRESSURE: 126 MMHG | WEIGHT: 244 LBS | OXYGEN SATURATION: 96 % | HEIGHT: 66 IN | DIASTOLIC BLOOD PRESSURE: 82 MMHG | BODY MASS INDEX: 39.21 KG/M2 | HEART RATE: 86 BPM

## 2021-07-23 DIAGNOSIS — G62.0 CHEMOTHERAPY-INDUCED NEUROPATHY (H): ICD-10-CM

## 2021-07-23 DIAGNOSIS — T45.1X5A CHEMOTHERAPY-INDUCED NEUROPATHY (H): ICD-10-CM

## 2021-07-23 DIAGNOSIS — R74.8 ELEVATED LIVER ENZYMES: ICD-10-CM

## 2021-07-23 DIAGNOSIS — M19.071 PRIMARY OSTEOARTHRITIS OF BOTH FEET: Primary | ICD-10-CM

## 2021-07-23 DIAGNOSIS — Z13.220 SCREENING CHOLESTEROL LEVEL: ICD-10-CM

## 2021-07-23 DIAGNOSIS — C50.911: ICD-10-CM

## 2021-07-23 DIAGNOSIS — M85.80 LOW BONE MASS: ICD-10-CM

## 2021-07-23 DIAGNOSIS — M19.072 PRIMARY OSTEOARTHRITIS OF BOTH FEET: Primary | ICD-10-CM

## 2021-07-23 DIAGNOSIS — E66.01 MORBID OBESITY (H): ICD-10-CM

## 2021-07-23 DIAGNOSIS — F41.1 ANXIETY STATE: ICD-10-CM

## 2021-07-23 DIAGNOSIS — F41.1 GAD (GENERALIZED ANXIETY DISORDER): ICD-10-CM

## 2021-07-23 PROCEDURE — 90471 IMMUNIZATION ADMIN: CPT | Performed by: INTERNAL MEDICINE

## 2021-07-23 PROCEDURE — 96372 THER/PROPH/DIAG INJ SC/IM: CPT | Performed by: INTERNAL MEDICINE

## 2021-07-23 PROCEDURE — 99214 OFFICE O/P EST MOD 30 MIN: CPT | Mod: 25 | Performed by: INTERNAL MEDICINE

## 2021-07-23 PROCEDURE — 90732 PPSV23 VACC 2 YRS+ SUBQ/IM: CPT | Performed by: INTERNAL MEDICINE

## 2021-07-23 RX ORDER — GABAPENTIN 300 MG/1
300 CAPSULE ORAL AT BEDTIME
Qty: 90 CAPSULE | Refills: 3 | Status: SHIPPED | OUTPATIENT
Start: 2021-07-23 | End: 2022-08-18

## 2021-07-23 RX ORDER — CYANOCOBALAMIN 1000 UG/ML
1000 INJECTION, SOLUTION INTRAMUSCULAR; SUBCUTANEOUS
Status: DISCONTINUED | OUTPATIENT
Start: 2021-07-23 | End: 2023-10-24

## 2021-07-23 RX ORDER — DULOXETIN HYDROCHLORIDE 60 MG/1
60 CAPSULE, DELAYED RELEASE ORAL EVERY MORNING
Qty: 90 CAPSULE | Refills: 3 | Status: SHIPPED | OUTPATIENT
Start: 2021-07-23 | End: 2022-08-09

## 2021-07-23 RX ADMIN — CYANOCOBALAMIN 1000 MCG: 1000 INJECTION, SOLUTION INTRAMUSCULAR; SUBCUTANEOUS at 17:43

## 2021-07-23 ASSESSMENT — MIFFLIN-ST. JEOR: SCORE: 1698.53

## 2021-07-23 NOTE — PATIENT INSTRUCTIONS
Update blood tests    Check some tests of arthritis    Do another B 12 shot    Update pneumovax 23    Right ear symptoms related to sinus.  Could try nasonex or flonase OTC steroid nose sprays    Skin spots are benign and seborrheic keratosis    Update pap in 2023

## 2021-07-23 NOTE — PROGRESS NOTES
ASSESSMENT:  1. Primary osteoarthritis of both feet  Uncertain if gout may be part of this. Will order CRP, uric acid, sed rate  - CBC with platelets; Future  - CRP inflammation; Future  - Uric acid; Future  - Erythrocyte sedimentation rate auto; Future  - CBC with platelets  - CRP inflammation  - Uric acid  - Erythrocyte sedimentation rate auto    2. Chemotherapy-induced neuropathy (H)  Well managed with Cymbalta and gabapentin. She notes that B12 injections have helped with pain in the past.   - DULoxetine (CYMBALTA) 60 MG capsule; Take 1 capsule (60 mg) by mouth every morning  Dispense: 90 capsule; Refill: 3  - gabapentin (NEURONTIN) 300 MG capsule; Take 1 capsule (300 mg) by mouth At Bedtime  Dispense: 90 capsule; Refill: 3  - Vitamin B12; Future  - cyanocobalamin injection 1,000 mcg  - Vitamin B12    3. Morbid obesity (H)  If arthritis pain was better, she would be able to exercise more. Consider switchhing gabapentin to topiramate to aid in weight loss.     4. Primary breast infiltrating ductal carcinoma, right (H)  She has had a bilateral mastectomy.     5. Anxiety state  Well managed. She takes lorazepam as needed for dental procedures. Mood is well controlled with Cymbalta.     6. Low bone mass  Given history of chemo for breast cancer, we will want to update her DXA and monitor for bone density changes.   - Vitamin D Deficiency; Future  - DEXA HIP/PELVIS/SPINE - Future; Future  - Vitamin D Deficiency    7. Elevated liver enzymes  S/p cholecystectomy. Will recheck LFTs today.   - Comprehensive metabolic panel; Future  - TSH; Future  - Comprehensive metabolic panel  - TSH    8. Screening cholesterol level  Ordered lipid panel today.   - Lipid Profile; Future  - Lipid Profile    9. CATHY (generalized anxiety disorder)  Well controlled on Cymbalta.   - DULoxetine (CYMBALTA) 60 MG capsule; Take 1 capsule (60 mg) by mouth every morning  Dispense: 90 capsule; Refill: 3      PLAN:  Patient Instructions   Patient  Instructions   Update blood tests    Check some tests of arthritis    Do another B 12 shot    Update pneumovax 23    Right ear symptoms related to sinus.  Could try nasonex or flonase OTC steroid nose sprays    Skin spots are benign and seborrheic keratosis    Update pap in 2023        CHIEF COMPLAINT:  Chief Complaint   Patient presents with     RECHECK       HISTORY OF PRESENT ILLNESS:  Victorina is a 60 year old female presenting to the clinic today to follow up regarding her arthritis and anxiety. She also has new concerns of ear fullness.     Anxiety: Mood is well controlled on Cymbalta. She uses lorazepam as needed for dental procedures.     Arthritis: She was told she has arthritis in her feet. She mostly feels pain in her forefeet and her toes. Her pain is well managed with Celebrex prn. She denies history of gout. She has had a B12 shot in the past that has helped with pain. She takes a B12 daily supplement.     Neuropathy: This is most likely a post-chemo neuropathy, and is well managed with gabapentin.     Ear fullness: She notes a sensation of fluid/fullness in her right ear. She denies ear pain or drainage. She does tend to have some sinus congestion on her right side, with some post nasal drip. She often wakes up with nasal congestion.     Health maintenance: Her last pap smear from 2018 was normal. Cologaurd from 2020 was negative. She is up to date on her shingles vaccine. Mammograms no longer needed due to bilateral mastectomy.       REVIEW OF SYSTEMS:   She has 2 moles that she would like to have examined.       PFSH:  Social History     Social History Narrative     Not on file     She will be updating her power of  this fall.     TOBACCO USE:  History   Smoking Status     Never Smoker   Smokeless Tobacco     Never Used       VITALS:  Vitals:    07/23/21 1638   BP: 126/82   BP Location: Left arm   Patient Position: Sitting   Cuff Size: Adult Regular   Pulse: 86   SpO2: 96%   Weight: 110.7  "kg (244 lb)   Height: 1.676 m (5' 6\")     Wt Readings from Last 3 Encounters:   07/23/21 110.7 kg (244 lb)   02/19/20 110.7 kg (244 lb 2 oz)   11/15/16 105.7 kg (233 lb)     Body mass index is 39.38 kg/m .    PHYSICAL EXAM:  Constitutional:  Reveals an alert, pleasant, talkative female.  Cardiac:  Regular rate and rhythm  Palpation of radial pulse 2+, strong, regular  Legs: no peripheral edema  Lungs: Clear.  Respiratory effort normal.  Psychiatric:  Slightly anxious.  Skin: 1 cm pigmented nevus on the right distal forearm. 0.5 cm seborrheic keratosis on the left side of the neck.       MEDICATIONS:  Current Outpatient Medications   Medication Sig Dispense Refill     celecoxib (CELEBREX) 200 MG capsule [CELECOXIB (CELEBREX) 200 MG CAPSULE] Take 1 capsule (200 mg total) by mouth 2 (two) times a day as needed. 90 capsule 3     cholecalciferol, vitamin D3, 1,000 unit (25 mcg) tablet [CHOLECALCIFEROL, VITAMIN D3, 1,000 UNIT (25 MCG) TABLET] Take 2,000 Units by mouth daily.       cyanocobalamin, vitamin B-12, 1,000 mcg Subl [CYANOCOBALAMIN, VITAMIN B-12, 1,000 MCG SUBL] Place 2,000 mcg under the tongue daily.       DULoxetine (CYMBALTA) 60 MG capsule Take 1 capsule (60 mg) by mouth every morning 90 capsule 3     gabapentin (NEURONTIN) 300 MG capsule Take 1 capsule (300 mg) by mouth At Bedtime 90 capsule 3     LORazepam (ATIVAN) 1 MG tablet [LORAZEPAM (ATIVAN) 1 MG TABLET] Take 1 mg by mouth as needed for anxiety. For dental appointments        acetaminophen (TYLENOL) 500 MG tablet [ACETAMINOPHEN (TYLENOL) 500 MG TABLET] Take 500-1,000 mg by mouth 3 (three) times a day as needed.           Notes summarized: None  Labs, x-rays, cardiology, GI tests reviewed: Ordered uric acid, sed rate, CRP, vitamin D, B12, lipids, TSH, CBC, CMP labs today.   New orders:   Orders Placed This Encounter   Procedures     DEXA HIP/PELVIS/SPINE - Future     PPSV23, IM/SUBQ (2+ YRS) - Eafpoidco49     Comprehensive metabolic panel     CBC with " platelets     Lipid Profile     TSH     Vitamin B12     Vitamin D Deficiency     CRP inflammation     Uric acid     Erythrocyte sedimentation rate auto     Vitamin B12     Vitamin D Deficiency     CRP inflammation     Comprehensive metabolic panel     Lipid Profile     TSH     Erythrocyte sedimentation rate auto     CBC with platelets       Independent review of:  Supplemental history by:  None    Conversation plus orders:  26 minutes  Dictation time:  3 minutes    The visit lasted a total of 22 minutes by me +26 minutes by medical student for a total of 48 minutes     Services performed by a medical student in the presence of a physician.     MIAH Conklin IV    Discussed case with medical student. Personally discussed with patient and examined. Personally wrote out instructions and after visit summary    Gino Mensah MD

## 2021-10-11 ENCOUNTER — HEALTH MAINTENANCE LETTER (OUTPATIENT)
Age: 61
End: 2021-10-11

## 2021-11-11 ENCOUNTER — LAB (OUTPATIENT)
Dept: LAB | Facility: CLINIC | Age: 61
End: 2021-11-11
Payer: COMMERCIAL

## 2021-11-11 DIAGNOSIS — Z13.220 SCREENING CHOLESTEROL LEVEL: ICD-10-CM

## 2021-11-11 DIAGNOSIS — M19.072 PRIMARY OSTEOARTHRITIS OF BOTH FEET: ICD-10-CM

## 2021-11-11 DIAGNOSIS — R74.8 ELEVATED LIVER ENZYMES: ICD-10-CM

## 2021-11-11 DIAGNOSIS — E66.01 MORBID OBESITY (H): ICD-10-CM

## 2021-11-11 DIAGNOSIS — M19.071 PRIMARY OSTEOARTHRITIS OF BOTH FEET: ICD-10-CM

## 2021-11-11 LAB
ALBUMIN SERPL-MCNC: 3.7 G/DL (ref 3.5–5)
ALP SERPL-CCNC: 105 U/L (ref 45–120)
ALT SERPL W P-5'-P-CCNC: 11 U/L (ref 0–45)
ANION GAP SERPL CALCULATED.3IONS-SCNC: 15 MMOL/L (ref 5–18)
AST SERPL W P-5'-P-CCNC: 19 U/L (ref 0–40)
BILIRUB SERPL-MCNC: 0.3 MG/DL (ref 0–1)
BUN SERPL-MCNC: 14 MG/DL (ref 8–22)
C REACTIVE PROTEIN LHE: 1.7 MG/DL (ref 0–0.8)
CALCIUM SERPL-MCNC: 10.1 MG/DL (ref 8.5–10.5)
CHLORIDE BLD-SCNC: 104 MMOL/L (ref 98–107)
CHOLEST SERPL-MCNC: 198 MG/DL
CO2 SERPL-SCNC: 23 MMOL/L (ref 22–31)
CREAT SERPL-MCNC: 0.84 MG/DL (ref 0.6–1.1)
ERYTHROCYTE [DISTWIDTH] IN BLOOD BY AUTOMATED COUNT: 13.9 % (ref 10–15)
ERYTHROCYTE [SEDIMENTATION RATE] IN BLOOD BY WESTERGREN METHOD: 14 MM/HR (ref 0–20)
FASTING STATUS PATIENT QL REPORTED: NO
GFR SERPL CREATININE-BSD FRML MDRD: 76 ML/MIN/1.73M2
GLUCOSE BLD-MCNC: 84 MG/DL (ref 70–125)
HCT VFR BLD AUTO: 44.5 % (ref 35–47)
HDLC SERPL-MCNC: 52 MG/DL
HGB BLD-MCNC: 14.1 G/DL (ref 11.7–15.7)
LDLC SERPL CALC-MCNC: 122 MG/DL
MCH RBC QN AUTO: 28.4 PG (ref 26.5–33)
MCHC RBC AUTO-ENTMCNC: 31.7 G/DL (ref 31.5–36.5)
MCV RBC AUTO: 90 FL (ref 78–100)
PLATELET # BLD AUTO: 361 10E3/UL (ref 150–450)
POTASSIUM BLD-SCNC: 4.2 MMOL/L (ref 3.5–5)
PROT SERPL-MCNC: 7.2 G/DL (ref 6–8)
RBC # BLD AUTO: 4.97 10E6/UL (ref 3.8–5.2)
SODIUM SERPL-SCNC: 142 MMOL/L (ref 136–145)
TRIGL SERPL-MCNC: 120 MG/DL
TSH SERPL DL<=0.005 MIU/L-ACNC: 1.26 UIU/ML (ref 0.3–5)
VIT B12 SERPL-MCNC: >2000 PG/ML (ref 213–816)
WBC # BLD AUTO: 9.3 10E3/UL (ref 4–11)

## 2021-11-11 PROCEDURE — 85652 RBC SED RATE AUTOMATED: CPT

## 2021-11-11 PROCEDURE — 80061 LIPID PANEL: CPT

## 2021-11-11 PROCEDURE — 82607 VITAMIN B-12: CPT

## 2021-11-11 PROCEDURE — 85027 COMPLETE CBC AUTOMATED: CPT

## 2021-11-11 PROCEDURE — 84443 ASSAY THYROID STIM HORMONE: CPT

## 2021-11-11 PROCEDURE — 82306 VITAMIN D 25 HYDROXY: CPT

## 2021-11-11 PROCEDURE — 80053 COMPREHEN METABOLIC PANEL: CPT

## 2021-11-11 PROCEDURE — 86141 C-REACTIVE PROTEIN HS: CPT

## 2021-11-11 PROCEDURE — 36415 COLL VENOUS BLD VENIPUNCTURE: CPT

## 2021-11-12 LAB — DEPRECATED CALCIDIOL+CALCIFEROL SERPL-MC: 58 UG/L (ref 30–80)

## 2021-12-20 ENCOUNTER — IMMUNIZATION (OUTPATIENT)
Dept: NURSING | Facility: CLINIC | Age: 61
End: 2021-12-20
Payer: COMMERCIAL

## 2021-12-20 PROCEDURE — 91300 PR COVID VAC PFIZER DIL RECON 30 MCG/0.3 ML IM: CPT

## 2021-12-20 PROCEDURE — 0004A PR COVID VAC PFIZER DIL RECON 30 MCG/0.3 ML IM: CPT

## 2021-12-29 DIAGNOSIS — L08.9 FINGER INFECTION: Primary | ICD-10-CM

## 2021-12-29 RX ORDER — MUPIROCIN 20 MG/G
OINTMENT TOPICAL 3 TIMES DAILY
Qty: 15 G | Refills: 1 | Status: SHIPPED | OUTPATIENT
Start: 2021-12-29 | End: 2022-02-28

## 2022-02-27 ENCOUNTER — MYC MEDICAL ADVICE (OUTPATIENT)
Dept: INTERNAL MEDICINE | Facility: CLINIC | Age: 62
End: 2022-02-27
Payer: COMMERCIAL

## 2022-02-28 ENCOUNTER — VIRTUAL VISIT (OUTPATIENT)
Dept: INTERNAL MEDICINE | Facility: CLINIC | Age: 62
End: 2022-02-28
Payer: COMMERCIAL

## 2022-02-28 DIAGNOSIS — J98.8 VIRAL RESPIRATORY INFECTION: ICD-10-CM

## 2022-02-28 DIAGNOSIS — T45.1X5A CHEMOTHERAPY-INDUCED NEUROPATHY (H): ICD-10-CM

## 2022-02-28 DIAGNOSIS — E66.01 MORBID OBESITY (H): ICD-10-CM

## 2022-02-28 DIAGNOSIS — G62.0 CHEMOTHERAPY-INDUCED NEUROPATHY (H): ICD-10-CM

## 2022-02-28 DIAGNOSIS — Z00.00 PREVENTATIVE HEALTH CARE: ICD-10-CM

## 2022-02-28 DIAGNOSIS — B97.89 VIRAL RESPIRATORY INFECTION: ICD-10-CM

## 2022-02-28 DIAGNOSIS — C50.911: ICD-10-CM

## 2022-02-28 DIAGNOSIS — J02.8 ACUTE PHARYNGITIS DUE TO OTHER SPECIFIED ORGANISMS: ICD-10-CM

## 2022-02-28 DIAGNOSIS — J01.00 ACUTE NON-RECURRENT MAXILLARY SINUSITIS: Primary | ICD-10-CM

## 2022-02-28 PROBLEM — F41.1 ANXIETY STATE: Status: ACTIVE | Noted: 2022-02-28

## 2022-02-28 PROBLEM — F41.1 ANXIETY STATE: Status: RESOLVED | Noted: 2022-02-28 | Resolved: 2022-02-28

## 2022-02-28 PROCEDURE — 99215 OFFICE O/P EST HI 40 MIN: CPT | Mod: 95 | Performed by: INTERNAL MEDICINE

## 2022-02-28 RX ORDER — LIDOCAINE HYDROCHLORIDE 20 MG/ML
15 SOLUTION OROPHARYNGEAL
Qty: 100 ML | Refills: 3 | Status: SHIPPED | OUTPATIENT
Start: 2022-02-28 | End: 2022-02-28

## 2022-02-28 RX ORDER — AMOXICILLIN AND CLAVULANATE POTASSIUM 500; 125 MG/1; MG/1
1 TABLET, FILM COATED ORAL 2 TIMES DAILY
Qty: 20 TABLET | Refills: 0 | Status: SHIPPED | OUTPATIENT
Start: 2022-02-28 | End: 2022-09-16

## 2022-02-28 RX ORDER — PREDNISONE 20 MG/1
20 TABLET ORAL DAILY
Qty: 4 TABLET | Refills: 0 | Status: SHIPPED | OUTPATIENT
Start: 2022-02-28 | End: 2022-09-16

## 2022-02-28 RX ORDER — GUAIFENESIN 600 MG/1
600 TABLET, EXTENDED RELEASE ORAL 2 TIMES DAILY
Qty: 60 TABLET | Refills: 2 | Status: SHIPPED | OUTPATIENT
Start: 2022-02-28 | End: 2022-09-16

## 2022-02-28 RX ORDER — AMOXICILLIN AND CLAVULANATE POTASSIUM 500; 125 MG/1; MG/1
1 TABLET, FILM COATED ORAL 2 TIMES DAILY
Qty: 20 TABLET | Refills: 0 | Status: SHIPPED | OUTPATIENT
Start: 2022-02-28 | End: 2022-02-28

## 2022-02-28 RX ORDER — PREDNISONE 20 MG/1
20 TABLET ORAL DAILY
Qty: 4 TABLET | Refills: 0 | Status: SHIPPED | OUTPATIENT
Start: 2022-02-28 | End: 2022-02-28

## 2022-02-28 RX ORDER — GUAIFENESIN 600 MG/1
600 TABLET, EXTENDED RELEASE ORAL 2 TIMES DAILY
Qty: 60 TABLET | Refills: 2 | Status: SHIPPED | OUTPATIENT
Start: 2022-02-28 | End: 2022-02-28

## 2022-02-28 RX ORDER — LIDOCAINE HYDROCHLORIDE 20 MG/ML
15 SOLUTION OROPHARYNGEAL
Qty: 100 ML | Refills: 3 | Status: SHIPPED | OUTPATIENT
Start: 2022-02-28 | End: 2022-09-16

## 2022-02-28 NOTE — PATIENT INSTRUCTIONS
Augmentin 500 mg twice daily for 10 days    Prednisone 20 mg daily for 4 days    Guaifenesin twice daily    Viscous lidocaine as needed

## 2022-02-28 NOTE — PROGRESS NOTES
Victorina is a 61 year old female contacting the clinic today via video, who will use the platform: [employer] for the visit.  Phone # for [employer], or if Amwell drops:   Telephone Information:   Mobile [phone]          ASSESSMENT and PLAN:  1. Acute non-recurrent maxillary sinusitis  Bacterial after viral trigger.  Will treat  - amoxicillin-clavulanate (AUGMENTIN) 500-125 MG tablet; Take 1 tablet by mouth 2 times daily  Dispense: 20 tablet; Refill: 0  - guaiFENesin (MUCINEX) 600 MG 12 hr tablet; Take 1 tablet (600 mg) by mouth 2 times daily  Dispense: 60 tablet; Refill: 2  - predniSONE (DELTASONE) 20 MG tablet; Take 1 tablet (20 mg) by mouth daily  Dispense: 4 tablet; Refill: 0    2. Acute pharyngitis due to other specified organisms  Probably secondary but cover strep nonetheless  - lidocaine, viscous, (XYLOCAINE) 2 % solution; Swish and spit 15 mLs in mouth every 3 hours as needed for moderate pain ; Max 8 doses/24 hour period.  Dispense: 100 mL; Refill: 3    3. Viral respiratory infection  Reviewed initiation of symptoms on February 20    4. Chemotherapy-induced neuropathy (H)  Symptoms persisting 14 years after chemo probably will not remit spontaneously    5. Morbid obesity (H)  Encouraged to exercise    6. Primary breast infiltrating ductal carcinoma, right (H)  Status post bilateral mastectomy 2014.  H&P reviewed    7. Preventative health care  Reviewed and otherwise up-to-date  - REVIEW OF HEALTH MAINTENANCE PROTOCOL ORDERS       Patient Instructions   Augmentin 500 mg twice daily for 10 days    Prednisone 20 mg daily for 4 days    Guaifenesin twice daily    Viscous lidocaine as needed        Medication list carefully reviewed and corrected  Epic Merger Problem list addressed and updated     Return in about 6 months (around 8/28/2022) for using a video visit.       CHIEF COMPLAINT:  Chief Complaint   Patient presents with     Tonsil Infection     Sore throat started on Friday.         HISTORY OF PRESENT  "ILLNESS:  Victorina is a 61 year old female contacting the clinic today via video for complaints of pharyngitis.  She became ill on February 20 catching a virus from who were living in her house.  For the next 6 days she had sinus congestion, cough and rhinorrhea.  2 days ago her symptoms worsened with severe sore throat, whitish exudate, persisting cough, no fever, ear pressure, and left eye conjunctivitis.  Over-the-counter medications have not been helpful    Breast cancer 2008 treated with mastectomies and chemo.  No longer needs mammograms.  Chemo has left her with neuropathy of the hands which she tolerates    REVIEW OF SYSTEMS:   Otherwise doing relatively well except for neuropathy    PFSH:  Social History     Social History Narrative    Single, never , no childrenWorks part-time for the [employer]       TOBACCO USE:  History   Smoking Status     Never Smoker   Smokeless Tobacco     Never Used       VITALS:  There were no vitals filed for this visit.  There were no vitals taken for this visit. Estimated body mass index is 39.38 kg/m  as calculated from the following:    Height as of 7/23/21: 1.676 m (5' 6\").    Weight as of 7/23/21: 110.7 kg (244 lb).    PHYSICAL EXAM:  (observations via Video)  Left eye swollen and mattering with surrounding erythema.  Coughing frequently.  Nasal congestion.  Attached photograph shows whitish exudate left tonsil    MEDICATIONS:   Current Outpatient Medications   Medication Sig Dispense Refill     amoxicillin-clavulanate (AUGMENTIN) 500-125 MG tablet Take 1 tablet by mouth 2 times daily 20 tablet 0     celecoxib (CELEBREX) 200 MG capsule [CELECOXIB (CELEBREX) 200 MG CAPSULE] Take 1 capsule (200 mg total) by mouth 2 (two) times a day as needed. 90 capsule 3     cholecalciferol, vitamin D3, 1,000 unit (25 mcg) tablet [CHOLECALCIFEROL, VITAMIN D3, 1,000 UNIT (25 MCG) TABLET] Take 2,000 Units by mouth daily.       cyanocobalamin, vitamin B-12, 1,000 mcg Subl " [CYANOCOBALAMIN, VITAMIN B-12, 1,000 MCG SUBL] Place 2,000 mcg under the tongue daily.       DULoxetine (CYMBALTA) 60 MG capsule Take 1 capsule (60 mg) by mouth every morning 90 capsule 3     gabapentin (NEURONTIN) 300 MG capsule Take 1 capsule (300 mg) by mouth At Bedtime 90 capsule 3     guaiFENesin (MUCINEX) 600 MG 12 hr tablet Take 1 tablet (600 mg) by mouth 2 times daily 60 tablet 2     lidocaine, viscous, (XYLOCAINE) 2 % solution Swish and spit 15 mLs in mouth every 3 hours as needed for moderate pain ; Max 8 doses/24 hour period. 100 mL 3     LORazepam (ATIVAN) 1 MG tablet [LORAZEPAM (ATIVAN) 1 MG TABLET] Take 1 mg by mouth as needed for anxiety. For dental appointments        predniSONE (DELTASONE) 20 MG tablet Take 1 tablet (20 mg) by mouth daily 4 tablet 0       Outside Notes summarized:   Labs, x-rays, cardiology, GI tests reviewed: Labs updated November Recent Labs   Lab Test 11/11/21  1510   HGB 14.1      POTASSIUM 4.2   CR 0.84   B12 >2,000*   TSH 1.26     No results found for: [id number]  Lab Results   Component Value Date    VITDT 58 11/11/2021     No components found for: VITD  Lab Results   Component Value Date    CHOL 198 11/11/2021     New orders:   Orders Placed This Encounter   Procedures     REVIEW OF HEALTH MAINTENANCE PROTOCOL ORDERS       Independent review of:    Supplemental history by:      Patient has given verbal consent to a Video visit?  Yes  How would you like to obtain your AVS?  MyChart  Will anyone else be joining your video visit? No       Video-Visit Details  Type of service:  Video Visit  Originating Location (pt. Location): Home  Distant Location (provider location):   [facility]    Gino Mensah MD  [facility]    Video Start Time: 2:12 PM  Video End time:  2:52 PM  Face to face plus orders: 40 minutes  Documentation time:  3 minutes     The visit lasted a total of 41 minutes

## 2022-02-28 NOTE — PROGRESS NOTES
"Kiara is a 61 year old who is being evaluated via a billable video visit.      How would you like to obtain your AVS? MyChart  If the video visit is dropped, the invitation should be resent by: Text to cell phone: [phone]  Will anyone else be joining your video visit? No  {If patient encounters technical issues they should call [phone] :562664}    Video Start Time: {video visit start/end time for provider to select:152948}    {PROVIDER CHARTING PREFERENCE:477328}    Subjective   Kiara is a 61 year old who presents for the following health issues {ACCOMPANIED BY STATEMENT (Optional):639853}    HPI     {SUPERLIST (Optional):367558}  {additonal problems for provider to add (Optional):462048}    Review of Systems   {ROS COMP (Optional):435918}      Objective           Vitals:  No vitals were obtained today due to virtual visit.    Physical Exam   {video visit exam brief selected:949467::\"GENERAL: Healthy, alert and no distress\",\"EYES: Eyes grossly normal to inspection.  No discharge or erythema, or obvious scleral/conjunctival abnormalities.\",\"RESP: No audible wheeze, cough, or visible cyanosis.  No visible retractions or increased work of breathing.  \",\"SKIN: Visible skin clear. No significant rash, abnormal pigmentation or lesions.\",\"NEURO: Cranial nerves grossly intact.  Mentation and speech appropriate for age.\",\"PSYCH: Mentation appears normal, affect normal/bright, judgement and insight intact, normal speech and appearance well-groomed.\"}    {Diagnostic Test Results (Optional):521919}    {AMBULATORY ATTESTATION (Optional):636935}        Video-Visit Details    Type of service:  Video Visit    Video End Time:{video visit start/end time for provider to select:292720}    Originating Location (pt. Location): {video visit patient location:855059::\"Home\"}    Distant Location (provider location):  [facility]     Platform used for Video Visit: {Virtual Visit " "Platforms:745031::\"[employer]urel\"}  "

## 2022-03-15 DIAGNOSIS — J01.00 ACUTE NON-RECURRENT MAXILLARY SINUSITIS: Primary | ICD-10-CM

## 2022-03-15 RX ORDER — DOXYCYCLINE HYCLATE 100 MG
100 TABLET ORAL 2 TIMES DAILY
Qty: 20 TABLET | Refills: 0 | Status: SHIPPED | OUTPATIENT
Start: 2022-03-15 | End: 2022-03-25

## 2022-05-22 ENCOUNTER — HEALTH MAINTENANCE LETTER (OUTPATIENT)
Age: 62
End: 2022-05-22

## 2022-06-09 ENCOUNTER — IMMUNIZATION (OUTPATIENT)
Dept: NURSING | Facility: CLINIC | Age: 62
End: 2022-06-09
Payer: COMMERCIAL

## 2022-06-09 PROCEDURE — 91305 COVID-19,PF,PFIZER (12+ YRS): CPT

## 2022-06-09 PROCEDURE — 0054A COVID-19,PF,PFIZER (12+ YRS): CPT

## 2022-08-08 DIAGNOSIS — T45.1X5A CHEMOTHERAPY-INDUCED NEUROPATHY (H): ICD-10-CM

## 2022-08-08 DIAGNOSIS — G62.0 CHEMOTHERAPY-INDUCED NEUROPATHY (H): ICD-10-CM

## 2022-08-08 DIAGNOSIS — F41.1 GAD (GENERALIZED ANXIETY DISORDER): ICD-10-CM

## 2022-08-09 RX ORDER — DULOXETIN HYDROCHLORIDE 60 MG/1
CAPSULE, DELAYED RELEASE ORAL
Qty: 90 CAPSULE | Refills: 3 | Status: SHIPPED | OUTPATIENT
Start: 2022-08-09 | End: 2022-09-16

## 2022-08-09 NOTE — TELEPHONE ENCOUNTER
"Routing refill request to provider for review/approval because:  BP not current    Last Written Prescription Date:  7/23/21  Last Fill Quantity: 90,  # refills: 3   Last office visit provider:  2/28/22     Requested Prescriptions   Pending Prescriptions Disp Refills     DULoxetine (CYMBALTA) 60 MG capsule [Pharmacy Med Name: DULOXETINE HCL DR CAPS 60MG] 90 capsule 3     Sig: TAKE 1 CAPSULE EVERY MORNING       Serotonin-Norepinephrine Reuptake Inhibitors  Failed - 8/8/2022  4:58 PM        Failed - Blood pressure under 140/90 in past 12 months     BP Readings from Last 3 Encounters:   07/23/21 126/82   01/24/20 132/86                 Passed - Recent (12 mo) or future (30 days) visit within the authorizing provider's specialty     Patient has had an office visit with the authorizing provider or a provider within the authorizing providers department within the previous 12 mos or has a future within next 30 days. See \"Patient Info\" tab in inbasket, or \"Choose Columns\" in Meds & Orders section of the refill encounter.              Passed - Medication is active on med list        Passed - Patient is age 18 or older        Passed - No active pregnancy on record        Passed - No positive pregnancy test in past 12 months             Jose Ortiz RN 08/09/22 7:22 AM  "

## 2022-08-18 DIAGNOSIS — G62.0 CHEMOTHERAPY-INDUCED NEUROPATHY (H): ICD-10-CM

## 2022-08-18 DIAGNOSIS — T45.1X5A CHEMOTHERAPY-INDUCED NEUROPATHY (H): ICD-10-CM

## 2022-08-18 RX ORDER — GABAPENTIN 300 MG/1
CAPSULE ORAL
Qty: 90 CAPSULE | Refills: 3 | Status: SHIPPED | OUTPATIENT
Start: 2022-08-18 | End: 2022-09-16

## 2022-08-18 NOTE — TELEPHONE ENCOUNTER
General: Routing refill request to provider for review/approval because:  Drug not on the [facility] refill protocol     Last Written Prescription Date:  7/23/21  Last Fill Quantity: 90,  # refills: 3   Last office visit provider:  2/28/22     Requested Prescriptions   Pending Prescriptions Disp Refills     gabapentin (NEURONTIN) 300 MG capsule [Pharmacy Med Name: GABAPENTIN CAPS 300MG] 90 capsule 3     Sig: TAKE 1 CAPSULE AT BEDTIME       There is no refill protocol information for this order          Juliana Castañeda, RN 08/18/22 1:02 PM

## 2022-09-09 ASSESSMENT — ENCOUNTER SYMPTOMS
NERVOUS/ANXIOUS: 0
SHORTNESS OF BREATH: 0
CHILLS: 0
FEVER: 0
BREAST MASS: 0
DYSURIA: 0
EYE PAIN: 0
SORE THROAT: 0
PALPITATIONS: 0
FREQUENCY: 0
COUGH: 0
ABDOMINAL PAIN: 0
PARESTHESIAS: 0
CONSTIPATION: 0
DIZZINESS: 0
HEMATURIA: 0
HEMATOCHEZIA: 0
WEAKNESS: 0
NAUSEA: 0
HEADACHES: 0
ARTHRALGIAS: 1
MYALGIAS: 0
JOINT SWELLING: 0
DIARRHEA: 0
HEARTBURN: 0

## 2022-09-16 ENCOUNTER — OFFICE VISIT (OUTPATIENT)
Dept: INTERNAL MEDICINE | Facility: CLINIC | Age: 62
End: 2022-09-16
Payer: COMMERCIAL

## 2022-09-16 VITALS — OXYGEN SATURATION: 98 % | SYSTOLIC BLOOD PRESSURE: 124 MMHG | HEART RATE: 88 BPM | DIASTOLIC BLOOD PRESSURE: 76 MMHG

## 2022-09-16 DIAGNOSIS — F41.1 GAD (GENERALIZED ANXIETY DISORDER): ICD-10-CM

## 2022-09-16 DIAGNOSIS — M19.071 PRIMARY OSTEOARTHRITIS OF BOTH FEET: ICD-10-CM

## 2022-09-16 DIAGNOSIS — I89.0 LYMPHEDEMA OF RIGHT ARM: ICD-10-CM

## 2022-09-16 DIAGNOSIS — T45.1X5A CHEMOTHERAPY-INDUCED NEUROPATHY (H): ICD-10-CM

## 2022-09-16 DIAGNOSIS — J01.00 ACUTE NON-RECURRENT MAXILLARY SINUSITIS: ICD-10-CM

## 2022-09-16 DIAGNOSIS — M12.812 ROTATOR CUFF ARTHROPATHY, LEFT: ICD-10-CM

## 2022-09-16 DIAGNOSIS — C50.911: ICD-10-CM

## 2022-09-16 DIAGNOSIS — S73.005S DISLOCATION OF LEFT HIP, SEQUELA: ICD-10-CM

## 2022-09-16 DIAGNOSIS — G62.0 CHEMOTHERAPY-INDUCED NEUROPATHY (H): ICD-10-CM

## 2022-09-16 DIAGNOSIS — H35.3131 EARLY DRY STAGE NONEXUDATIVE AGE-RELATED MACULAR DEGENERATION OF BOTH EYES: ICD-10-CM

## 2022-09-16 DIAGNOSIS — M19.072 PRIMARY OSTEOARTHRITIS OF BOTH FEET: ICD-10-CM

## 2022-09-16 DIAGNOSIS — Z00.00 PREVENTATIVE HEALTH CARE: Primary | ICD-10-CM

## 2022-09-16 DIAGNOSIS — J32.0 CHRONIC MAXILLARY SINUSITIS: ICD-10-CM

## 2022-09-16 LAB
ABO/RH(D): NORMAL
ALBUMIN SERPL BCG-MCNC: 4 G/DL (ref 3.5–5.2)
ALP SERPL-CCNC: 104 U/L (ref 35–104)
ALT SERPL W P-5'-P-CCNC: <5 U/L (ref 10–35)
ANION GAP SERPL CALCULATED.3IONS-SCNC: 11 MMOL/L (ref 7–15)
AST SERPL W P-5'-P-CCNC: 25 U/L (ref 10–35)
BILIRUB SERPL-MCNC: 0.2 MG/DL
BUN SERPL-MCNC: 14.9 MG/DL (ref 8–23)
CALCIUM SERPL-MCNC: 10 MG/DL (ref 8.8–10.2)
CHLORIDE SERPL-SCNC: 104 MMOL/L (ref 98–107)
CHOLEST SERPL-MCNC: 216 MG/DL
CREAT SERPL-MCNC: 0.63 MG/DL (ref 0.51–0.95)
DEPRECATED HCO3 PLAS-SCNC: 27 MMOL/L (ref 22–29)
GFR SERPL CREATININE-BSD FRML MDRD: >90 ML/MIN/1.73M2
GLUCOSE SERPL-MCNC: 84 MG/DL (ref 70–99)
HDLC SERPL-MCNC: 54 MG/DL
LDLC SERPL CALC-MCNC: 139 MG/DL
NONHDLC SERPL-MCNC: 162 MG/DL
POTASSIUM SERPL-SCNC: 4.1 MMOL/L (ref 3.4–5.3)
PROT SERPL-MCNC: 7.4 G/DL (ref 6.4–8.3)
SODIUM SERPL-SCNC: 142 MMOL/L (ref 136–145)
SPECIMEN EXPIRATION DATE: NORMAL
TRIGL SERPL-MCNC: 116 MG/DL
URATE SERPL-MCNC: 3.9 MG/DL (ref 2.4–5.7)

## 2022-09-16 PROCEDURE — 0124A COVID-19,PF,PFIZER BOOSTER BIVALENT: CPT | Performed by: INTERNAL MEDICINE

## 2022-09-16 PROCEDURE — 99214 OFFICE O/P EST MOD 30 MIN: CPT | Mod: 25 | Performed by: INTERNAL MEDICINE

## 2022-09-16 PROCEDURE — 91312 COVID-19,PF,PFIZER BOOSTER BIVALENT: CPT | Performed by: INTERNAL MEDICINE

## 2022-09-16 PROCEDURE — 86901 BLOOD TYPING SEROLOGIC RH(D): CPT | Performed by: INTERNAL MEDICINE

## 2022-09-16 PROCEDURE — 84550 ASSAY OF BLOOD/URIC ACID: CPT | Performed by: INTERNAL MEDICINE

## 2022-09-16 PROCEDURE — 86900 BLOOD TYPING SEROLOGIC ABO: CPT | Performed by: INTERNAL MEDICINE

## 2022-09-16 PROCEDURE — 80061 LIPID PANEL: CPT | Performed by: INTERNAL MEDICINE

## 2022-09-16 PROCEDURE — 80053 COMPREHEN METABOLIC PANEL: CPT | Performed by: INTERNAL MEDICINE

## 2022-09-16 PROCEDURE — 99396 PREV VISIT EST AGE 40-64: CPT | Performed by: INTERNAL MEDICINE

## 2022-09-16 PROCEDURE — 36415 COLL VENOUS BLD VENIPUNCTURE: CPT | Performed by: INTERNAL MEDICINE

## 2022-09-16 PROCEDURE — 84443 ASSAY THYROID STIM HORMONE: CPT | Performed by: INTERNAL MEDICINE

## 2022-09-16 RX ORDER — CELECOXIB 200 MG/1
200 CAPSULE ORAL 2 TIMES DAILY PRN
Qty: 90 CAPSULE | Refills: 3 | Status: SHIPPED | OUTPATIENT
Start: 2022-09-16 | End: 2024-02-05

## 2022-09-16 RX ORDER — FOLIC ACID 0.8 MG
800 TABLET ORAL DAILY
COMMUNITY
End: 2023-10-24

## 2022-09-16 RX ORDER — DULOXETIN HYDROCHLORIDE 30 MG/1
30 CAPSULE, DELAYED RELEASE ORAL DAILY
Qty: 90 CAPSULE | Refills: 3 | Status: SHIPPED | OUTPATIENT
Start: 2022-09-16 | End: 2023-08-15

## 2022-09-16 RX ORDER — GABAPENTIN 300 MG/1
CAPSULE ORAL
Qty: 90 CAPSULE | Refills: 3 | Status: SHIPPED | OUTPATIENT
Start: 2022-09-16 | End: 2023-10-24

## 2022-09-16 RX ORDER — PREDNISONE 10 MG/1
10 TABLET ORAL EVERY MORNING
Qty: 20 TABLET | Refills: 1 | Status: SHIPPED | OUTPATIENT
Start: 2022-09-16 | End: 2024-09-10

## 2022-09-16 RX ORDER — DULOXETIN HYDROCHLORIDE 60 MG/1
CAPSULE, DELAYED RELEASE ORAL
Qty: 90 CAPSULE | Refills: 3 | Status: SHIPPED | OUTPATIENT
Start: 2022-09-16 | End: 2023-10-24

## 2022-09-16 RX ORDER — LACTOBACILLUS ACIDOPHILUS/PECT 30 MG-20MG
TABLET ORAL
COMMUNITY

## 2022-09-16 NOTE — PROGRESS NOTES
ASSESSMENT/PLAN:     ASSESSMENT and PLAN:  1. Preventative health care  - Lipid Profile; Future  - COVID-19,PF,PFIZER BOOSTER BIVALENT; Future  - ABO and Rh; Future  - COVID-19,PF,PFIZER BOOSTER BIVALENT  - Lipid Profile  - ABO and Rh    2. Lymphedema of right arm  Agree with trial of physical therapy after radical mastectomy    3. Rotator cuff arthropathy, left  - Physical Therapy Referral; Future    4. Primary osteoarthritis of both feet  Rule out uric acid arthropathy coexisting  - Physical Therapy Referral; Future  - Uric acid; Future  - Comprehensive metabolic panel; Future  - celecoxib (CELEBREX) 200 MG capsule; Take 1 capsule (200 mg) by mouth 2 times daily as needed for pain  Dispense: 90 capsule; Refill: 3  - Uric acid  - Comprehensive metabolic panel    5. Primary breast infiltrating ductal carcinoma, right (H)  Status postmastectomy and 2008    6. Dislocation of left hip, sequela  Consider possible Nuris-Danlos    7. CATHY (generalized anxiety disorder)  Discussed trial of slightly higher dose duloxetine.  Rule out thyroid  - TSH; Future  - DULoxetine (CYMBALTA) 60 MG capsule; TAKE 1 CAPSULE EVERY MORNING  Dispense: 90 capsule; Refill: 3  - DULoxetine (CYMBALTA) 30 MG capsule; Take 1 capsule (30 mg) by mouth daily  Dispense: 90 capsule; Refill: 3  - TSH    8. Early dry stage nonexudative age-related macular degeneration of both eyes  New and noted    9. Chemotherapy-induced neuropathy (H)  Trial of higher dose duloxetine  - DULoxetine (CYMBALTA) 60 MG capsule; TAKE 1 CAPSULE EVERY MORNING  Dispense: 90 capsule; Refill: 3  - gabapentin (NEURONTIN) 300 MG capsule; TAKE 1 CAPSULE AT BEDTIME  Dispense: 90 capsule; Refill: 3  - DULoxetine (CYMBALTA) 30 MG capsule; Take 1 capsule (30 mg) by mouth daily  Dispense: 90 capsule; Refill: 3    10. Acute non-recurrent maxillary sinusitis  Status posttreatment in March 11. Chronic maxillary sinusitis  Discussed frequent progression from viral to bacterial and  "inflammatory nature of same.  CT scan to rule out structural disease  - predniSONE (DELTASONE) 10 MG tablet; Take 1 tablet (10 mg) by mouth every morning for 4 days  Dispense: 20 tablet; Refill: 1  - CT Sinus w/o Contrast; Future     Preventive Care Assessed: Reviewed and updated    Patient Instructions   Letter done    Physical therapy for left hip, left shoulder and right arm lymphedema    Do you have babs danlos syndrome?    Refill meds    Update labs including blood type    We cannot tell if you have had covid after vaccination    Chronic sinus is viral or allergy triggered, then infection and swelling and sometimes structural issues, which then allow bacteria to set up shop, and require antibiotics    You can intervene earlier to reduce swelling and hopefully not progress to antibiotics     Anti inflammatory (naproxen or ibuprofen or celebrex, not tylenol) with or without steroids    Prednisone 10 mgs plus or minus rx for head cold    CT sinus to look for structural disease    Uric acid can be in normal range, but still be high enough to cause symptoms, so any level over 5 I treat    Allow duloxetine 60 vs 90 mgs daily with a max of 120 and flex dose    Flu and covid booster today            Return in about 1 year (around 9/16/2023) for in person.       COUNSELING:   Reviewed preventive health counseling, as reflected in patient instructions       Colorectal cancer screening    Estimated body mass index is 39.38 kg/m  as calculated from the following:    Height as of 7/23/21: 1.676 m (5' 6\").    Weight as of 7/23/21: 110.7 kg (244 lb).     Weight management plan: Discussed healthy diet and exercise guidelines      SUBJECTIVE:     Victorina Goldman is an 61 year old male who presents for preventative health visit.       Patient has been advised of split billing requirements and indicates understanding: Yes  Healthy Habits:     Getting at least 3 servings of Calcium per day:  Yes    Bi-annual eye exam:  Yes    " Dental care twice a year:  Yes    Sleep apnea or symptoms of sleep apnea:  None    Diet:  Regular (no restrictions)    Frequency of exercise:  2-3 days/week    Taking medications regularly:  Yes    Medication side effects:  Not applicable    PHQ-2 Total Score: 0    Additional concerns today:  Yes        Today's PHQ-2 Score:   PHQ-2 ( 1999 Pfizer) 9/9/2022   Q1: Little interest or pleasure in doing things 0   Q2: Feeling down, depressed or hopeless 0   PHQ-2 Score 0   PHQ-2 Total Score (12-17 Years)- Positive if 3 or more points; Administer PHQ-A if positive -   Q1: Little interest or pleasure in doing things Not at all   Q2: Feeling down, depressed or hopeless Not at all   PHQ-2 Score 0       Abuse: Current or Past(Physical, Sexual or Emotional)- No  Do you feel safe in your environment? Yes    Have you ever done Advance Care Planning? (For example, a Health Directive, POLST, or a discussion with a medical provider or your loved ones about your wishes): Yes, patient states has an Advance Care Planning document and will bring a copy to the clinic.        Alcohol Use 9/9/2022   Prescreen: >3 drinks/day or >7 drinks/week? Not Applicable       Reviewed and updated as needed this visit by clinical staff   Tobacco  Allergies  Meds  Problems               CHIEF COMPLAINT:  Chief Complaint   Patient presents with     Physical       HPI: New diagnosis of dry macular degeneration in August    Bone-on-bone arthritis and balls of feet with pain.  Has never had gout    Anxiety and neuropathy treated with duloxetine.  Previously was on Effexor    Frequent sinus infections.  Antibiotics doxycycline last used in February by my chart communication    Review of Systems: Shoulder injury.  Rotator cuff problems.  Recent left hip dislocation.  No previous history of Nuris-Danlos.  Please see above.      Patient Care Team:  Gino Mensah MD as PCP - General (Internal Medicine)  Solomon Pizano MD, MD as MD (Hematology &  Oncology)  Eddie Alves, PharmD as Pharmacist (Pharmacist)  Gino Mensah MD as Assigned PCP     Patient Active Problem List   Diagnosis     Primary breast infiltrating ductal carcinoma, right (H)     Iron deficiency anemia     CATHY (generalized anxiety disorder)     Esophageal Reflux     Lymphedema of right arm     S/P laparoscopic cholecystectomy     Obesity (BMI 35.0-39.9) with comorbidity (H)     Chemotherapy-induced neuropathy (H)     Primary osteoarthritis of both feet     Early dry stage nonexudative age-related macular degeneration of both eyes     Past Medical History:   Diagnosis Date     Anemia      Anxiety      Arthritis     L ankle     Breast cancer (H)      Cancer (H)      Ear infection      Eyelid cyst     removed  by dr Ibarra 2015     GERD (gastroesophageal reflux disease)      History of back injury 1992     History of sprained knee 2013     Plantar fasciitis     Left     Scoliosis 10/2016      Past Surgical History:   Procedure Laterality Date     IR MISCELLANEOUS PROCEDURE  2008     IR MISCELLANEOUS PROCEDURE  3/6/2009     MASTECTOMY MODIFIED RADICAL Bilateral 2008     REVISE RECONSTRUCTED BREAST       WISDOM TOOTH EXTRACTION        Family History   Problem Relation Age of Onset     Dementia Father      Heart Disease Father          age 82     Other - See Comments Mother          age 74 of pneumonia      Social History     Socioeconomic History     Marital status: Single     Spouse name: Not on file     Number of children: Not on file     Years of education: Not on file     Highest education level: Not on file   Occupational History     Not on file   Tobacco Use     Smoking status: Never Smoker     Smokeless tobacco: Never Used   Substance and Sexual Activity     Alcohol use: No     Drug use: No     Sexual activity: Never   Other Topics Concern     Not on file   Social History Narrative    Single, never , no childrenWorks part-time for the [employer] [place]     Social Determinants of Health     Financial Resource Strain: Not on file   Food Insecurity: Not on file   Transportation Needs: Not on file   Physical Activity: Not on file   Stress: Not on file   Social Connections: Not on file   Intimate Partner Violence: Not on file   Housing Stability: Not on file      Social History     Social History Narrative    Single, never , no childrenWorks part-time for the [employer] [place]       Current Outpatient Medications   Medication Sig Dispense Refill     celecoxib (CELEBREX) 200 MG capsule Take 1 capsule (200 mg) by mouth 2 times daily as needed for pain 90 capsule 3     cholecalciferol, vitamin D3, 1,000 unit (25 mcg) tablet Take 5,000 Units by mouth daily       cyanocobalamin, vitamin B-12, 1,000 mcg Subl [CYANOCOBALAMIN, VITAMIN B-12, 1,000 MCG SUBL] Place 2,000 mcg under the tongue daily.       DULoxetine (CYMBALTA) 30 MG capsule Take 1 capsule (30 mg) by mouth daily 90 capsule 3     DULoxetine (CYMBALTA) 60 MG capsule TAKE 1 CAPSULE EVERY MORNING 90 capsule 3     folic acid 800 MCG tablet Take 800 mcg by mouth daily       gabapentin (NEURONTIN) 300 MG capsule TAKE 1 CAPSULE AT BEDTIME 90 capsule 3     LORazepam (ATIVAN) 1 MG tablet [LORAZEPAM (ATIVAN) 1 MG TABLET] Take 1 mg by mouth as needed for anxiety. For dental appointments        Multiple Vitamins-Minerals (ICAPS AREDS 2 PO)        Multiple Vitamins-Minerals (MULTIVITAL PO)        predniSONE (DELTASONE) 10 MG tablet Take 1 tablet (10 mg) by mouth every morning for 4 days 20 tablet 1     Pyridoxine HCl (VITAMIN B6) 250 MG TABS             OBJECTIVE:   VITALS:  Vitals:    09/16/22 1525   BP: 124/76   BP Location: Left arm   Patient Position: Sitting   Cuff Size: Adult Large   Pulse: 88   SpO2: 98%     /76 (BP Location: Left arm, Patient Position: Sitting, Cuff Size: Adult Large)   Pulse 88   SpO2 98%  Estimated body mass index is 39.38 kg/m  as calculated from the following:    Height as of  "7/23/21: 1.676 m (5' 6\").    Weight as of 7/23/21: 110.7 kg (244 lb).    PHYSICAL EXAM :  Constitutional:  Reveals pleasant talkative overweight woman who ambulates briskly  Palpation of radial pulse regular   Ears:  Clear without wax   Thyroid:  Non palpable   Cardiac; Regular rate and rhythm   Lungs: Clear.  Respiratory effort normal.  Edema of Legs: None   Psychiatric:  Alert and oriented         She reports that she has never smoked. She has never used smokeless tobacco.      Recent Labs   Lab Test 11/11/21  1510   HGB 14.1      POTASSIUM 4.2   CR 0.84   B12 >2,000*   TSH 1.26   VITDT 58     Lab Results   Component Value Date    CHOL 198 11/11/2021         Start Time: 3:33 PM  End time:  4:24 PM  Conversation plus orders: 51 minutes  Dictation time:  3 minutes    The visit lasted a total of 54 minutes       Gino Mensah MD  [facility]    Answers for HPI/ROS submitted by the patient on 9/9/2022  abdominal pain: No  Blood in stool: No  Blood in urine: No  chest pain: No  chills: No  congestion: No  constipation: No  cough: No  diarrhea: No  dizziness: No  ear pain: No  eye pain: No  nervous/anxious: No  fever: No  frequency: No  genital sores: No  headaches: No  hearing loss: No  heartburn: No  arthralgias: Yes  joint swelling: No  peripheral edema: No  mood changes: No  myalgias: No  nausea: No  dysuria: No  palpitations: No  Skin sensation changes: No  sore throat: No  urgency: No  rash: No  shortness of breath: No  visual disturbance: No  weakness: No  pelvic pain: No  vaginal bleeding: No  vaginal discharge: No  tenderness: No  breast mass: No  breast discharge: No      "

## 2022-09-16 NOTE — LETTER
September 16, 2022      Victorina Goldman  [facility] [address] [facility] [address]        To Whom It May Concern,      Kiara has had rotator cuff issues in both shoulders and has right arm lymphedema.  She should therefore restrict lifting to no more than 20 lbs, and avoid pulling heavy objects like pallets, and should avoid reaching above shoulder level.          Sincerely,        Gino Mensah MD

## 2022-09-16 NOTE — PATIENT INSTRUCTIONS
Letter done    Physical therapy for left hip, left shoulder and right arm lymphedema    Do you have babs danlos syndrome?    Refill meds    Update labs including blood type    We cannot tell if you have had covid after vaccination    Chronic sinus is viral or allergy triggered, then infection and swelling and sometimes structural issues, which then allow bacteria to set up shop, and require antibiotics    You can intervene earlier to reduce swelling and hopefully not progress to antibiotics     Anti inflammatory (naproxen or ibuprofen or celebrex, not tylenol) with or without steroids    Prednisone 10 mgs plus or minus rx for head cold    CT sinus to look for structural disease    Uric acid can be in normal range, but still be high enough to cause symptoms, so any level over 5 I treat    Allow duloxetine 60 vs 90 mgs daily with a max of 120 and flex dose    Flu and covid booster today

## 2022-09-17 LAB — TSH SERPL DL<=0.005 MIU/L-ACNC: 1.27 UIU/ML (ref 0.3–4.2)

## 2022-09-18 ENCOUNTER — MYC MEDICAL ADVICE (OUTPATIENT)
Dept: INTERNAL MEDICINE | Facility: CLINIC | Age: 62
End: 2022-09-18

## 2022-09-30 ENCOUNTER — HOSPITAL ENCOUNTER (OUTPATIENT)
Dept: CT IMAGING | Facility: HOSPITAL | Age: 62
Discharge: HOME | End: 2022-09-30
Attending: INTERNAL MEDICINE | Admitting: INTERNAL MEDICINE
Payer: COMMERCIAL

## 2022-09-30 DIAGNOSIS — J32.0 CHRONIC MAXILLARY SINUSITIS: ICD-10-CM

## 2022-09-30 PROCEDURE — 70486 CT MAXILLOFACIAL W/O DYE: CPT

## 2022-09-30 NOTE — TELEPHONE ENCOUNTER
Writer contacted patient and advised that her chart was reviewed and the letter had not been marked as sent.  Writer marked the letter sent so that it would show in MyChart.  Patient was given writer's direct number if she has additional questions about this issue.    Ava Batista M.A., LPN  Clinic Manager  [facility]

## 2022-10-25 DIAGNOSIS — J01.01 ACUTE RECURRENT MAXILLARY SINUSITIS: Primary | ICD-10-CM

## 2022-10-25 RX ORDER — PREDNISONE 20 MG/1
20 TABLET ORAL EVERY MORNING
Qty: 20 TABLET | Refills: 0 | Status: SHIPPED | OUTPATIENT
Start: 2022-10-25 | End: 2022-10-29

## 2022-10-25 RX ORDER — DOXYCYCLINE HYCLATE 100 MG
100 TABLET ORAL 2 TIMES DAILY
Qty: 20 TABLET | Refills: 0 | Status: SHIPPED | OUTPATIENT
Start: 2022-10-25 | End: 2022-11-04

## 2022-11-07 ENCOUNTER — HOSPITAL ENCOUNTER (OUTPATIENT)
Dept: PHYSICAL THERAPY | Facility: REHABILITATION | Age: 62
Discharge: HOME | End: 2022-11-07
Attending: INTERNAL MEDICINE
Payer: COMMERCIAL

## 2022-11-07 DIAGNOSIS — M19.071 PRIMARY OSTEOARTHRITIS OF BOTH FEET: ICD-10-CM

## 2022-11-07 DIAGNOSIS — M12.812 ROTATOR CUFF ARTHROPATHY, LEFT: ICD-10-CM

## 2022-11-07 DIAGNOSIS — J01.01 ACUTE RECURRENT MAXILLARY SINUSITIS: Primary | ICD-10-CM

## 2022-11-07 DIAGNOSIS — M25.552 HIP PAIN, LEFT: Primary | ICD-10-CM

## 2022-11-07 DIAGNOSIS — M19.072 PRIMARY OSTEOARTHRITIS OF BOTH FEET: ICD-10-CM

## 2022-11-07 PROCEDURE — 97110 THERAPEUTIC EXERCISES: CPT | Mod: GP | Performed by: PHYSICAL THERAPIST

## 2022-11-07 PROCEDURE — 97161 PT EVAL LOW COMPLEX 20 MIN: CPT | Mod: GP | Performed by: PHYSICAL THERAPIST

## 2022-11-07 NOTE — PROGRESS NOTES
11/07/22 1420   General Information   Type of Visit Initial OP Ortho PT Evaluation   Start of Care Date 11/07/22   Referring Physician Gino Mensah MD   Patient/Family Goals Statement range of motion   Orders Evaluate and Treat   Date of Order 09/16/22   Certification Required? No   Medical Diagnosis M19.071, M19.072 (ICD-10-CM) - Primary osteoarthritis of both feet  M12.812 (ICD-10-CM) - Rotator cuff arthropathy, left   Surgical/Medical history reviewed Yes   Presentation and Etiology   Pertinent history of current problem (include personal factors and/or comorbidities that impact the POC) Patient is a 61 year old who presents with L sided shoulder pain primarily with work activities. Pain started in April 2022 when moving pallets at work improving over time has not done as much lifting at work. Pain is interfering with QOL regarding pain upon waking, difficulty work related tasks lifting, pushing, pulling, reaching for handrail completing stairs.  April-August was more painful and had to compensate with ADLs, household, tasks and general acitivities.   Impairments B. Decreased WB tolerance;D. Decreased ROM;E. Decreased flexibility;F. Decreased strength and endurance   Functional Limitations perform required work activities   Symptom Location anterior shoulder   How/Where did it occur With repetition/overuse   Onset date of current episode/exacerbation 09/16/22  (april 2022)   Chronicity Chronic   Pain rating (0-10 point scale) Best (/10);Worst (/10)   Best (/10) 1-2/10   Worst (/10) 3/10  (continues to need to compensate with work tasks)   Pain quality C. Aching;H. Other  (stabbing sprained shoulder feeling progressing into throbbing ache)   Frequency of pain/symptoms D. Other  (with specific movements)   Pain/symptoms are: Worse in the morning  (mostly movement related, is achey in AM if they lift)   Pain/symptoms exacerbated by C. Lifting;D. Carrying;G. Certain positions;H. Overhead reach;L. Work tasks    Pain/symptoms eased by E. Changing positions;F. Certain positions;I. OTC medication(s)  (celebrex on occasion)   Progression of symptoms since onset: Improved   Prior Level of Function   Prior Level of Function-Mobility independent without compensation   Current Level of Function   Patient role/employment history A. Employed   Employment Comments [employer]   Fall Risk Screen   Fall screen completed by PT   Have you fallen 2 or more times in the past year? No   Have you fallen and had an injury in the past year? No   Is patient a fall risk? No   Abuse Screen (yes response referral indicated)   Feels Unsafe at Home or Work/School no   Feels Threatened by Someone no   Does Anyone Try to Keep You From Having Contact with Others or Doing Things Outside Your Home? no   Physical Signs of Abuse Present no   Shoulder Objective Findings   Posture forward head, rounded shoulders   Cervical Screen (ROM, quadrant) no pain WFL all directions flex/ext/SBL/SBR/RotL/RotR   Shoulder ROM Comment WFL flex/abd/ext/ER/IR; functional ROM limited in IR/Ext/Add R T6/L T9, ER/ABD/FLex near equal L is 1-2 inches less than R, Supine ROM 90/90 ER 80 R 55 R (tight anterior) IR 74R 70L   Shoulder Flexibility Comments strength flex 4/5 L 4+/5 R, Abd 4/5 L 4+/5 R, ext 4+/5 L 5/5 R, ER 4/5 B, IR 3+/5 L, 4/5R   Planned Therapy Interventions   Planned Therapy Interventions joint mobilization;manual therapy;neuromuscular re-education;ROM;strengthening;stretching   Clinical Impression   Criteria for Skilled Therapeutic Interventions Met yes, treatment indicated   PT Diagnosis L shoulder pain   Influenced by the following impairments decreased ROM, impaired strength, pain   Functional limitations due to impairments reaching, pulling weight, pushing weight, carrying, lifting overhead   Clinical Presentation Stable/Uncomplicated   Clinical Presentation Rationale Presents as expected based on chart review   Clinical Decision Making (Complexity) Low  complexity   Therapy Frequency 1 time/week   Predicted Duration of Therapy Intervention (days/wks) 6-8 visits over 8-12 weeks  (dependent on availability and work schedule)   Risk & Benefits of therapy have been explained Yes   Patient, Family & other staff in agreement with plan of care Yes   Clinical Impression Comments Patient presents with a history of shoulder pain consistent with general shoulder strain will require PT to promote progression of shoulder strength and functional mobility to return to PLOF.   Ortho Goal 1   Goal Identifier Pulling/pushing   Goal Description Patient will be able to push/pull 25# with 3/10 or less sx for improved variability with work related tasks.   Goal Progress initiated   Target Date 01/06/23   Ortho Goal 2   Goal Identifier Lift/carry   Goal Description Patient will be able to lift/carry 25# with 3/10 or less pain and will be able to lift over shoulder height.   Goal Progress initiated   Target Date 01/06/23   Ortho Goal 3   Goal Identifier reach   Goal Description Patient will be able to reach forward to use handrail on stairs without pain greater than 3/10   Goal Progress initiated   Target Date 01/06/23   Ortho Goal 4   Goal Identifier HEP   Goal Description Patient will be able to complete 6 exercises without cue for progression to independent management.   Goal Progress initiated   Target Date 01/06/23   Total Evaluation Time   PT Eval, Low Complexity Minutes (92093) 20   Katharine Lewis, PT

## 2022-11-15 ENCOUNTER — HOSPITAL ENCOUNTER (OUTPATIENT)
Dept: PHYSICAL THERAPY | Facility: REHABILITATION | Age: 62
Discharge: HOME | End: 2022-11-15
Attending: INTERNAL MEDICINE
Payer: COMMERCIAL

## 2022-11-15 DIAGNOSIS — M19.071 PRIMARY OSTEOARTHRITIS OF BOTH FEET: Primary | ICD-10-CM

## 2022-11-15 DIAGNOSIS — M25.552 HIP PAIN, LEFT: ICD-10-CM

## 2022-11-15 DIAGNOSIS — M12.812 ROTATOR CUFF ARTHROPATHY, LEFT: ICD-10-CM

## 2022-11-15 DIAGNOSIS — M19.072 PRIMARY OSTEOARTHRITIS OF BOTH FEET: Primary | ICD-10-CM

## 2022-11-15 PROCEDURE — 97110 THERAPEUTIC EXERCISES: CPT | Mod: GP | Performed by: PHYSICAL THERAPIST

## 2022-12-15 ENCOUNTER — HOSPITAL ENCOUNTER (OUTPATIENT)
Dept: PHYSICAL THERAPY | Facility: REHABILITATION | Age: 62
Discharge: HOME | End: 2022-12-15
Payer: COMMERCIAL

## 2022-12-15 DIAGNOSIS — M12.812 ROTATOR CUFF ARTHROPATHY, LEFT: ICD-10-CM

## 2022-12-15 DIAGNOSIS — M19.071 PRIMARY OSTEOARTHRITIS OF BOTH FEET: Primary | ICD-10-CM

## 2022-12-15 DIAGNOSIS — M25.552 HIP PAIN, LEFT: ICD-10-CM

## 2022-12-15 DIAGNOSIS — M19.072 PRIMARY OSTEOARTHRITIS OF BOTH FEET: Primary | ICD-10-CM

## 2022-12-15 PROCEDURE — 97110 THERAPEUTIC EXERCISES: CPT | Mod: GP | Performed by: PHYSICAL THERAPIST

## 2022-12-29 ENCOUNTER — HOSPITAL ENCOUNTER (OUTPATIENT)
Dept: PHYSICAL THERAPY | Facility: REHABILITATION | Age: 62
Discharge: HOME | End: 2022-12-29
Payer: COMMERCIAL

## 2022-12-29 DIAGNOSIS — M25.552 HIP PAIN, LEFT: ICD-10-CM

## 2022-12-29 DIAGNOSIS — M19.072 PRIMARY OSTEOARTHRITIS OF BOTH FEET: Primary | ICD-10-CM

## 2022-12-29 DIAGNOSIS — M12.812 ROTATOR CUFF ARTHROPATHY, LEFT: ICD-10-CM

## 2022-12-29 DIAGNOSIS — M19.071 PRIMARY OSTEOARTHRITIS OF BOTH FEET: Primary | ICD-10-CM

## 2022-12-29 PROCEDURE — 97110 THERAPEUTIC EXERCISES: CPT | Mod: GP | Performed by: PHYSICAL THERAPIST

## 2023-04-26 NOTE — PROGRESS NOTES
[facility]    Outpatient Physical Therapy Discharge Note  Patient: Victorina Goldman  : 1960    Beginning/End Dates of Reporting Period:  2022  to 2022    Referring Provider: Gino Mensah MD    Therapy Diagnosis: B foot pain, L shoulder pain     Client Self Report: Hips are doing really well.  Mostly concerned about arm.  Doesn't have pain unless she moves wrong. Continues to have pain upon waking in shoulder.    Objective Measurements:  Objective Measure: shoulder IR/ER supine at 90/90  Details: ER L55 pull ant R 80, IR L75 75R  Objective Measure: hip strength & ROM  Details: L hip glut max & med/hamstring 3+/5, R hip glut max & med 4/5  Objective Measure: B foot pain  Details: assess as appropriate          Goals:  Goal Identifier Pulling/pushing   Goal Description Patient will be able to push/pull 25# with 3/10 or less sx for improved variability with work related tasks.   Target Date 23   Date Met      Progress (detail required for progress note): in progress     Goal Identifier Lift/carry   Goal Description Patient will be able to lift/carry 25# with 3/10 or less pain and will be able to lift over shoulder height.   Target Date 23   Date Met      Progress (detail required for progress note): in progress     Goal Identifier reach   Goal Description Patient will be able to reach forward to use handrail on stairs without pain greater than 3/10   Target Date 23   Date Met      Progress (detail required for progress note): in progress     Goal Identifier HEP   Goal Description Patient will be able to complete 6 exercises without cue for progression to independent management.   Target Date 23   Date Met      Progress (detail required for progress note): in progress     Goal Identifier     Goal Description     Target Date     Date Met      Progress (detail required  for progress note):       Goal Identifier     Goal Description     Target Date     Date Met      Progress (detail required for progress note):       Goal Identifier     Goal Description     Target Date     Date Met      Progress (detail required for progress note):       Goal Identifier     Goal Description     Target Date     Date Met      Progress (detail required for progress note):             Plan:  Discharge from therapy.    Discharge:    Reason for Discharge: Patient has failed to schedule further appointments.        Discharge Plan: Patient to continue home program.

## 2023-04-26 NOTE — ADDENDUM NOTE
Encounter addended by: Aury Lewis, PT on: 4/26/2023 8:42 AM   Actions taken: Episode resolved, Pend clinical note, Flowsheet accepted, Clinical Note Signed

## 2023-08-14 DIAGNOSIS — T45.1X5A CHEMOTHERAPY-INDUCED NEUROPATHY (H): ICD-10-CM

## 2023-08-14 DIAGNOSIS — F41.1 GAD (GENERALIZED ANXIETY DISORDER): ICD-10-CM

## 2023-08-14 DIAGNOSIS — G62.0 CHEMOTHERAPY-INDUCED NEUROPATHY (H): ICD-10-CM

## 2023-08-15 RX ORDER — DULOXETIN HYDROCHLORIDE 30 MG/1
CAPSULE, DELAYED RELEASE ORAL
Qty: 90 CAPSULE | Refills: 0 | Status: SHIPPED | OUTPATIENT
Start: 2023-08-15 | End: 2023-10-24

## 2023-08-15 NOTE — TELEPHONE ENCOUNTER
"Last Written Prescription Date:  9/16/22  Last Fill Quantity: 90,  # refills: 3   Last office visit provider:  9/16/22, PCP     Requested Prescriptions   Pending Prescriptions Disp Refills    DULoxetine (CYMBALTA) 30 MG capsule [Pharmacy Med Name: DULOXETINE HCL DR CAPS 30MG] 90 capsule 3     Sig: TAKE 1 CAPSULE DAILY (TO ADD TO 60 MG FOR TOTAL OF 90 MG DAILY)       Serotonin-Norepinephrine Reuptake Inhibitors  Passed - 8/14/2023  6:56 PM        Passed - Blood pressure under 140/90 in past 12 months     BP Readings from Last 3 Encounters:   09/16/22 124/76   07/23/21 126/82   01/24/20 132/86                 Passed - Recent (12 mo) or future (30 days) visit within the authorizing provider's specialty     Patient has had an office visit with the authorizing provider or a provider within the authorizing providers department within the previous 12 mos or has a future within next 30 days. See \"Patient Info\" tab in inbasket, or \"Choose Columns\" in Meds & Orders section of the refill encounter.              Passed - Medication is active on med list        Passed - Patient is age 18 or older        Passed - No active pregnancy on record        Passed - No positive pregnancy test in past 12 months             Luna Kingsley RN 08/15/23 9:13 AM  "

## 2023-08-17 ENCOUNTER — PATIENT OUTREACH (OUTPATIENT)
Dept: CARE COORDINATION | Facility: CLINIC | Age: 63
End: 2023-08-17
Payer: COMMERCIAL

## 2023-08-31 ENCOUNTER — PATIENT OUTREACH (OUTPATIENT)
Dept: CARE COORDINATION | Facility: CLINIC | Age: 63
End: 2023-08-31
Payer: COMMERCIAL

## 2023-10-24 ENCOUNTER — LAB (OUTPATIENT)
Dept: INTERNAL MEDICINE | Facility: CLINIC | Age: 63
End: 2023-10-24

## 2023-10-24 ENCOUNTER — OFFICE VISIT (OUTPATIENT)
Dept: INTERNAL MEDICINE | Facility: CLINIC | Age: 63
End: 2023-10-24
Payer: COMMERCIAL

## 2023-10-24 VITALS
SYSTOLIC BLOOD PRESSURE: 120 MMHG | TEMPERATURE: 98.6 F | HEART RATE: 78 BPM | BODY MASS INDEX: 39.38 KG/M2 | RESPIRATION RATE: 16 BRPM | OXYGEN SATURATION: 97 % | HEIGHT: 66 IN | DIASTOLIC BLOOD PRESSURE: 74 MMHG

## 2023-10-24 DIAGNOSIS — F41.1 GAD (GENERALIZED ANXIETY DISORDER): Primary | ICD-10-CM

## 2023-10-24 DIAGNOSIS — I89.0 LYMPHEDEMA OF RIGHT ARM: ICD-10-CM

## 2023-10-24 DIAGNOSIS — Z86.39 HISTORY OF IRON DEFICIENCY: ICD-10-CM

## 2023-10-24 DIAGNOSIS — E66.01 MORBID OBESITY (H): ICD-10-CM

## 2023-10-24 DIAGNOSIS — Z12.11 SCREEN FOR COLON CANCER: ICD-10-CM

## 2023-10-24 DIAGNOSIS — Z00.00 PREVENTATIVE HEALTH CARE: ICD-10-CM

## 2023-10-24 DIAGNOSIS — T45.1X5A CHEMOTHERAPY-INDUCED NEUROPATHY (H): ICD-10-CM

## 2023-10-24 DIAGNOSIS — G62.0 CHEMOTHERAPY-INDUCED NEUROPATHY (H): ICD-10-CM

## 2023-10-24 DIAGNOSIS — C50.911: ICD-10-CM

## 2023-10-24 DIAGNOSIS — Z12.4 CERVICAL CANCER SCREENING: ICD-10-CM

## 2023-10-24 PROCEDURE — 99215 OFFICE O/P EST HI 40 MIN: CPT | Performed by: INTERNAL MEDICINE

## 2023-10-24 RX ORDER — DULOXETIN HYDROCHLORIDE 30 MG/1
CAPSULE, DELAYED RELEASE ORAL
Qty: 90 CAPSULE | Refills: 0 | Status: CANCELLED | OUTPATIENT
Start: 2023-10-24

## 2023-10-24 RX ORDER — VENLAFAXINE HYDROCHLORIDE 75 MG/1
75 CAPSULE, EXTENDED RELEASE ORAL DAILY
Qty: 90 CAPSULE | Refills: 3 | Status: SHIPPED | OUTPATIENT
Start: 2023-10-24 | End: 2024-09-10

## 2023-10-24 RX ORDER — GABAPENTIN 300 MG/1
CAPSULE ORAL
Qty: 90 CAPSULE | Refills: 3 | Status: SHIPPED | OUTPATIENT
Start: 2023-10-24 | End: 2024-09-10

## 2023-10-24 NOTE — PATIENT INSTRUCTIONS
Max dose of duloxetine is 120 mgs  Max dose of venlafaxine is 150 mgs    You were on venlafaxine 75 mgs daily 2 years ago    Celiac test is TTG blood draw    Cholesterol is ok for age, and check every 5 years    You had the new covid shot last week, and I agree with that    We can skip blood this year    Update cologuard    I am very lukewarm to the RSV vaccine    You can have blood drawn right arm if needed, and hand without issue    Stop duloxetine 60 and continue 30 for 3 days, then stop duloxetine  Add venlafaxine 75 mgs daily

## 2023-10-24 NOTE — PROGRESS NOTES
"  {PROVIDER CHARTING PREFERENCE:850623}    Subjective   Kiara is a 62 year old, presenting for the following health issues:  office visit and Recheck Medication (Pt reports that she is here for medication recheck)      10/24/2023     4:10 PM   Additional Questions   Roomed by guru   Accompanied by alone         10/24/2023     4:10 PM   Patient Reported Additional Medications   Patient reports taking the following new medications none       History of Present Illness       Reason for visit:  Medication renew.  Question blood draw in right arm? Colon screening kit. Shots update    She eats 0-1 servings of fruits and vegetables daily.She consumes 2 sweetened beverage(s) daily.She exercises with enough effort to increase her heart rate 9 or less minutes per day.  She exercises with enough effort to increase her heart rate 3 or less days per week.   She is taking medications regularly.       {MA/LPN/RN Pre-Provider Visit Orders- hCG/UA/Strep (Optional):232579}  Pt reports that she is here for medication recheck.  {additonal problems for provider to add (Optional):431772}      Review of Systems   {ROS COMP (Optional):345780}      Objective    /74 (BP Location: Left arm, Patient Position: Sitting, Cuff Size: Adult Large)   Pulse 78   Temp 98.6  F (37  C) (Tympanic)   Resp 16   Ht 1.676 m (5' 6\")   LMP  (LMP Unknown)   SpO2 97%   Breastfeeding No   BMI 39.38 kg/m    Body mass index is 39.38 kg/m .  Physical Exam   {Exam List (Optional):753318}    {Diagnostic Test Results (Optional):700243}    {AMBULATORY ATTESTATION (Optional):863547}              "

## 2023-10-24 NOTE — PROGRESS NOTES
ASSESSMENT and PLAN:  1. CATHY (generalized anxiety disorder)  Trial of venlafaxine which she successfully took for years to replace duloxetine  - venlafaxine (EFFEXOR XR) 75 MG 24 hr capsule; Take 1 capsule (75 mg) by mouth daily  Dispense: 90 capsule; Refill: 3    2. Chemotherapy-induced neuropathy   Continue gabapentin.  Substitute venlafaxine  - gabapentin (NEURONTIN) 300 MG capsule; TAKE 1 CAPSULE AT BEDTIME  Dispense: 90 capsule; Refill: 3  - venlafaxine (EFFEXOR XR) 75 MG 24 hr capsule; Take 1 capsule (75 mg) by mouth daily  Dispense: 90 capsule; Refill: 3    3. Screen for colon cancer  Conservative screening is reasonable  - PRITI([employer]ES); Future    4. Cervical cancer screening  Okay to stop further cervical cancer screening due to lack of contact    5. Morbid obesity (H)  Stable    6. Primary breast infiltrating ductal carcinoma, right (H)  Status post bilateral mastectomies 15 years ago    7. Lymphedema of right arm  Discussed risks and benefits of having blood drawn in the right arm    8. Preventative health care  - REVIEW OF HEALTH MAINTENANCE PROTOCOL ORDERS    9. History of iron deficiency  Reasonable to screen for celiac disease  - Tissue transglutaminase jean pierre IgA and IgG; Future       Patient Instructions   Max dose of duloxetine is 120 mgs  Max dose of venlafaxine is 150 mgs    You were on venlafaxine 75 mgs daily 2 years ago    Celiac test is TTG blood draw    Cholesterol is ok for age, and check every 5 years    You had the new covid shot last week, and I agree with that    We can skip blood this year    Update cologuaabram    I am very lukewarm to the RSV vaccine    You can have blood drawn right arm if needed, and hand without issue    Stop duloxetine 60 and continue 30 for 3 days, then stop duloxetine  Add venlafaxine 75 mgs daily              Return in about 1 year (around 10/24/2024) for using a video visit.       CHIEF COMPLAINT:  Chief Complaint   Patient presents with    office  visit    Recheck Medication     Pt reports that she is here for medication recheck           10/24/2023     4:10 PM   Additional Questions   Roomed by guru   Accompanied by alone         10/24/2023     4:10 PM   Patient Reported Additional Medications   Patient reports taking the following new medications none       HISTORY OF PRESENT ILLNESS:  Victorina is a 62 year old female presenting to the clinic today for general review and update.  A family member has suggested that she be checked for celiac's disease.  She has been low on vitamin B12, vitamin D, and iron in the past but otherwise has no gastrointestinal symptoms    Underwent radical mastectomy right side and prophylactic mastectomy left side 15 years ago.  Has questions regarding right arm lymphedema precautions.  No obvious swelling.  Having some difficulty with blood drawn left arm    Does not need mammograms due to bilateral mastectomies.  Not sexually active and we have discussed no further pelvic and Paps.  She is due for colon cancer screening.    Discussed cholesterol and cardiac risk profile    Used to take venlafaxine for neuropathy and anxiety.  Converted to duloxetine in 2020 with initial improvement in symptoms but now she thinks venlafaxine worked better and would like to switch back    History of Present Illness       Reason for visit:  Medication renew.  Question blood draw in right arm? Colon screening kit. Shots update    She eats 0-1 servings of fruits and vegetables daily.She consumes 2 sweetened beverage(s) daily.She exercises with enough effort to increase her heart rate 9 or less minutes per day.  She exercises with enough effort to increase her heart rate 3 or less days per week.   She is taking medications regularly.      REVIEW OF SYSTEMS:   Stable mood    PFSH:  Social History     Social History Narrative    Single, never , no childrenWorks part-time for the [employer]       TOBACCO USE:  History   Smoking Status     "Never   Smokeless Tobacco    Never       VITALS:  Vitals:    10/24/23 1619   BP: 120/74   BP Location: Left arm   Patient Position: Sitting   Cuff Size: Adult Large   Pulse: 78   Resp: 16   Temp: 98.6  F (37  C)   TempSrc: Tympanic   SpO2: 97%   Height: 1.676 m (5' 6\")     Wt Readings from Last 3 Encounters:   07/23/21 110.7 kg (244 lb)   02/19/20 110.7 kg (244 lb 2 oz)   11/15/16 105.7 kg (233 lb)     Body mass index is 39.38 kg/m .    PHYSICAL EXAM:  Wearing mask when entering room?  No  Constitutional:  Reveals pleasant overweight woman who ambulates without difficulty    Heart regular rate and rhythm.  Lungs clear.  Legs without edema.  Right arm not visibly larger than left    MEDICATIONS:  Current Outpatient Medications   Medication Sig Dispense Refill    celecoxib (CELEBREX) 200 MG capsule Take 1 capsule (200 mg) by mouth 2 times daily as needed for pain 90 capsule 3    cholecalciferol, vitamin D3, 1,000 unit (25 mcg) tablet Take 5,000 Units by mouth daily      gabapentin (NEURONTIN) 300 MG capsule TAKE 1 CAPSULE AT BEDTIME 90 capsule 3    LORazepam (ATIVAN) 1 MG tablet [LORAZEPAM (ATIVAN) 1 MG TABLET] Take 1 mg by mouth as needed for anxiety. For dental appointments       Multiple Vitamins-Minerals (ICAPS AREDS 2 PO)       Multiple Vitamins-Minerals (MULTIVITAL PO)       Pyridoxine HCl (VITAMIN B6) 250 MG TABS       venlafaxine (EFFEXOR XR) 75 MG 24 hr capsule Take 1 capsule (75 mg) by mouth daily 90 capsule 3         Notes summarized:   Labs, x-rays, cardiology, GI tests reviewed:   Recent Labs   Lab Test 09/16/22  1650 11/11/21  1510   HGB  --  14.1    142   POTASSIUM 4.1 4.2   CR 0.63 0.84   URIC 3.9  --    B12  --  >2,000*   TSH 1.27 1.26   VITDT  --  58     Lab Results   Component Value Date    CHOL 216 09/16/2022     New orders:   Orders Placed This Encounter   Procedures    REVIEW OF HEALTH MAINTENANCE PROTOCOL ORDERS    Tissue transglutaminase jean pierre IgA and IgG       Independent review " of:  Supplemental history by:      Start Time: 4:42 PM  End time:  5:19 PM  Conversation plus orders: 37 minutes  Dictation time:  3 minutes    The visit lasted a total of 40 minutes     Gino Mensah MD

## 2023-11-03 DIAGNOSIS — R19.5 POSITIVE COLORECTAL CANCER SCREENING USING COLOGUARD TEST: Primary | ICD-10-CM

## 2023-11-03 LAB — NONINV COLON CA DNA+OCC BLD SCRN STL QL: POSITIVE

## 2023-11-14 ENCOUNTER — TRANSFERRED RECORDS (OUTPATIENT)
Dept: HEALTH INFORMATION MANAGEMENT | Facility: CLINIC | Age: 63
End: 2023-11-14
Payer: COMMERCIAL

## 2023-11-29 ENCOUNTER — DOCUMENTATION ONLY (OUTPATIENT)
Dept: OTHER | Facility: CLINIC | Age: 63
End: 2023-11-29
Payer: COMMERCIAL

## 2024-02-04 DIAGNOSIS — M19.071 PRIMARY OSTEOARTHRITIS OF BOTH FEET: ICD-10-CM

## 2024-02-04 DIAGNOSIS — M19.072 PRIMARY OSTEOARTHRITIS OF BOTH FEET: ICD-10-CM

## 2024-02-05 RX ORDER — CELECOXIB 200 MG/1
CAPSULE ORAL
Qty: 90 CAPSULE | Refills: 7 | Status: SHIPPED | OUTPATIENT
Start: 2024-02-05 | End: 2024-09-10

## 2024-09-10 ENCOUNTER — OFFICE VISIT (OUTPATIENT)
Dept: INTERNAL MEDICINE | Facility: CLINIC | Age: 64
End: 2024-09-10
Payer: COMMERCIAL

## 2024-09-10 VITALS
DIASTOLIC BLOOD PRESSURE: 68 MMHG | RESPIRATION RATE: 21 BRPM | HEART RATE: 109 BPM | HEIGHT: 66 IN | SYSTOLIC BLOOD PRESSURE: 132 MMHG | BODY MASS INDEX: 39.4 KG/M2 | TEMPERATURE: 98.8 F | OXYGEN SATURATION: 97 %

## 2024-09-10 DIAGNOSIS — J32.0 CHRONIC MAXILLARY SINUSITIS: ICD-10-CM

## 2024-09-10 DIAGNOSIS — E66.01 MORBID OBESITY (H): ICD-10-CM

## 2024-09-10 DIAGNOSIS — M19.072 PRIMARY OSTEOARTHRITIS OF BOTH FEET: ICD-10-CM

## 2024-09-10 DIAGNOSIS — F41.1 GAD (GENERALIZED ANXIETY DISORDER): ICD-10-CM

## 2024-09-10 DIAGNOSIS — Z00.00 PREVENTATIVE HEALTH CARE: ICD-10-CM

## 2024-09-10 DIAGNOSIS — Z85.3 HISTORY OF RIGHT BREAST CANCER: ICD-10-CM

## 2024-09-10 DIAGNOSIS — T45.1X5A CHEMOTHERAPY-INDUCED NEUROPATHY (H): ICD-10-CM

## 2024-09-10 DIAGNOSIS — D12.2 ADENOMA OF ASCENDING COLON: Primary | ICD-10-CM

## 2024-09-10 DIAGNOSIS — G62.0 CHEMOTHERAPY-INDUCED NEUROPATHY (H): ICD-10-CM

## 2024-09-10 DIAGNOSIS — M19.071 PRIMARY OSTEOARTHRITIS OF BOTH FEET: ICD-10-CM

## 2024-09-10 LAB
BASOPHILS # BLD AUTO: 0 10E3/UL (ref 0–0.2)
BASOPHILS NFR BLD AUTO: 0 %
EOSINOPHIL # BLD AUTO: 0 10E3/UL (ref 0–0.7)
EOSINOPHIL NFR BLD AUTO: 0 %
ERYTHROCYTE [DISTWIDTH] IN BLOOD BY AUTOMATED COUNT: 13.6 % (ref 10–15)
HCT VFR BLD AUTO: 45.6 % (ref 35–47)
HGB BLD-MCNC: 14.2 G/DL (ref 11.7–15.7)
IMM GRANULOCYTES # BLD: 0.1 10E3/UL
IMM GRANULOCYTES NFR BLD: 0 %
LYMPHOCYTES # BLD AUTO: 1.6 10E3/UL (ref 0.8–5.3)
LYMPHOCYTES NFR BLD AUTO: 10 %
MCH RBC QN AUTO: 28 PG (ref 26.5–33)
MCHC RBC AUTO-ENTMCNC: 31.1 G/DL (ref 31.5–36.5)
MCV RBC AUTO: 90 FL (ref 78–100)
MONOCYTES # BLD AUTO: 0.9 10E3/UL (ref 0–1.3)
MONOCYTES NFR BLD AUTO: 5 %
NEUTROPHILS # BLD AUTO: 13.9 10E3/UL (ref 1.6–8.3)
NEUTROPHILS NFR BLD AUTO: 84 %
PLATELET # BLD AUTO: 350 10E3/UL (ref 150–450)
RBC # BLD AUTO: 5.07 10E6/UL (ref 3.8–5.2)
WBC # BLD AUTO: 16.5 10E3/UL (ref 4–11)

## 2024-09-10 PROCEDURE — 90673 RIV3 VACCINE NO PRESERV IM: CPT | Performed by: INTERNAL MEDICINE

## 2024-09-10 PROCEDURE — 85025 COMPLETE CBC W/AUTO DIFF WBC: CPT | Performed by: INTERNAL MEDICINE

## 2024-09-10 PROCEDURE — 80048 BASIC METABOLIC PNL TOTAL CA: CPT | Performed by: INTERNAL MEDICINE

## 2024-09-10 PROCEDURE — 80061 LIPID PANEL: CPT | Performed by: INTERNAL MEDICINE

## 2024-09-10 PROCEDURE — 90471 IMMUNIZATION ADMIN: CPT | Performed by: INTERNAL MEDICINE

## 2024-09-10 PROCEDURE — 99214 OFFICE O/P EST MOD 30 MIN: CPT | Mod: 25 | Performed by: INTERNAL MEDICINE

## 2024-09-10 PROCEDURE — 36415 COLL VENOUS BLD VENIPUNCTURE: CPT | Performed by: INTERNAL MEDICINE

## 2024-09-10 RX ORDER — PREDNISONE 10 MG/1
10 TABLET ORAL EVERY MORNING
COMMUNITY
Start: 2024-09-10

## 2024-09-10 RX ORDER — GABAPENTIN 300 MG/1
CAPSULE ORAL
Qty: 90 CAPSULE | Refills: 3 | Status: SHIPPED | OUTPATIENT
Start: 2024-09-10

## 2024-09-10 RX ORDER — CELECOXIB 200 MG/1
200 CAPSULE ORAL DAILY
Qty: 90 CAPSULE | Refills: 3 | Status: SHIPPED | OUTPATIENT
Start: 2024-09-10 | End: 2025-09-10

## 2024-09-10 RX ORDER — VENLAFAXINE HYDROCHLORIDE 75 MG/1
75 CAPSULE, EXTENDED RELEASE ORAL DAILY
Qty: 90 CAPSULE | Refills: 3 | Status: SHIPPED | OUTPATIENT
Start: 2024-09-10

## 2024-09-10 ASSESSMENT — PAIN SCALES - GENERAL: PAINLEVEL: NO PAIN (0)

## 2024-09-10 NOTE — PROGRESS NOTES
"  {PROVIDER CHARTING PREFERENCE:177573}    Subjective   Kiara is a 63 year old, presenting for the following health issues:  Recheck Medication (He needs refills and new Rx's.  She needs a referral for a colonoscopy. )      9/10/2024     4:17 PM   Additional Questions   Roomed by Shagufta TRUJILLO     History of Present Illness       Reason for visit:  Prescriptions renewed   She is taking medications regularly.       {MA/LPN/RN Pre-Provider Visit Orders- hCG/UA/Strep (Optional):452372}  {SUPERLIST (Optional):393569}  {additonal problems for provider to add (Optional):975961}    {ROS Picklists (Optional):804490}      Objective    /68 (BP Location: Left arm, Patient Position: Sitting, Cuff Size: Adult Regular)   Pulse 109   Temp 98.8  F (37.1  C) (Tympanic)   Resp 21   Ht 1.676 m (5' 5.98\")   LMP  (LMP Unknown)   SpO2 97%   BMI 39.40 kg/m    Body mass index is 39.4 kg/m .  Physical Exam   {Exam List (Optional):480433}    {Diagnostic Test Results (Optional):213689}        Signed Electronically by: Gino Mensah MD  {Email feedback regarding this note to [email or web address]   :648586}  "

## 2024-09-10 NOTE — PATIENT INSTRUCTIONS
Colon scheduled for October 31, last done November 15, 16-20 mm, almost 1 full inch    Update labs    Flu shot    Covid shot later    No mammograms    Post menopause bone density 2021 was normal    After gallbladder gone, persisting stomach issues would be stomach acid    Common bile duct stones can be left over and mimic gallbladder

## 2024-09-10 NOTE — LETTER
September 10, 2024      Victorina Goldman  [facility] [facility] [id number]        To Whom It May Concern:    Victorina Goldman was seen in our clinic.  She had a colonoscopy on November 14, 2023 whereby a 20 mm polyp was discovered in the ascending colon.  Her gastroenterologist suggest a 1 year follow up.  Supporting documentation enclosed.      Sincerely,      Gino Mensah

## 2024-09-10 NOTE — PROGRESS NOTES
OFFICE VISIT--Face to face    ASSESSMENT and PLAN:  1. Adenoma of ascending colon  Reviewed colonoscopy from November 2023.  Letter provided to insurance company  - CBC with Platelets & Differential; Future  - CBC with Platelets & Differential    2. Chemotherapy-induced neuropathy (H24)  Continue gabapentin and venlafaxine  - gabapentin (NEURONTIN) 300 MG capsule; TAKE 1 CAPSULE AT BEDTIME  Dispense: 90 capsule; Refill: 3  - venlafaxine (EFFEXOR XR) 75 MG 24 hr capsule; Take 1 capsule (75 mg) by mouth daily.  Dispense: 90 capsule; Refill: 3    3. Morbid obesity (H)  Noted.  Weight refused.  Prior BMI greater than 30  - Lipid Profile; Future  - Basic metabolic panel; Future  - Lipid Profile  - Basic metabolic panel    4. History of right breast cancer  Noted and considered cured since 2008    5. CATHY (generalized anxiety disorder)  - venlafaxine (EFFEXOR XR) 75 MG 24 hr capsule; Take 1 capsule (75 mg) by mouth daily.  Dispense: 90 capsule; Refill: 3    6. Preventative health care  - INFLUENZA VACCINE TRIVALENT(FLUBLOK)    7. Chronic maxillary sinusitis  Successful use of prednisone  - predniSONE (DELTASONE) 10 MG tablet; Take 1 tablet (10 mg) by mouth every morning.    8. Primary osteoarthritis of both feet  - celecoxib (CELEBREX) 200 MG capsule; Take 1 capsule (200 mg) by mouth daily.  Dispense: 90 capsule; Refill: 3       Patient Instructions   Colon scheduled for October 31, last done November 15, 16-20 mm, almost 1 full inch    Update labs    Flu shot    Covid shot later    No mammograms    Post menopause bone density 2021 was normal    After gallbladder gone, persisting stomach issues would be stomach acid    Common bile duct stones can be left over and mimic gallbladder              Return in about 1 year (around 9/10/2025) for using a video visit.       CHIEF COMPLAINT:  Chief Complaint   Patient presents with    Recheck Medication     He needs refills and new Rx's.  She needs a referral for a colonoscopy.   "          9/10/2024     4:17 PM   Additional Questions   Roomed by Shagufta TRUJILLO       HISTORY OF PRESENT ILLNESS:  Victorina is a 63 year old female presenting to the clinic today to review medication.  She has suffered from recurrent sinus infections.  Use of prednisone 10 mg daily for 4 days has been successful    Breast cancer 2008 treated with bilateral mastectomies and chemotherapy.  Left with neuropathy but considered cured.  Takes gabapentin each night    Had Cologuard last year which was positive.  Referred for colonoscopy which showed 20 mm tubular adenoma of the ascending colon with recommendations to repeat in 1 year.  Has this scheduled for October 31.  Insurance requires letter    Complains of occasional dyspepsia    History of Present Illness       Reason for visit:  Prescriptions renewed   She is taking medications regularly.      REVIEW OF SYSTEMS:   Sleeps well    Today's PHQ-2 Score:       9/10/2024     2:12 PM   PHQ-2 ( 1999 [employer])   Q1: Little interest or pleasure in doing things 0   Q2: Feeling down, depressed or hopeless 0   PHQ-2 Score 0   Q1: Little interest or pleasure in doing things Not at all   Q2: Feeling down, depressed or hopeless Not at all   PHQ-2 Score 0       PFSH:  Social History     Social History Narrative    Single, never , no childrenWorks part-time for the [employer]     Works at [employer].  Busy season coming up    TOBACCO USE:  History   Smoking Status    Never   Smokeless Tobacco    Never       VITALS:  Vitals:    09/10/24 1620   BP: 132/68   BP Location: Left arm   Patient Position: Sitting   Cuff Size: Adult Regular   Pulse: 109   Resp: 21   Temp: 98.8  F (37.1  C)   TempSrc: Tympanic   SpO2: 97%   Height: 1.676 m (5' 5.98\")     Wt Readings from Last 3 Encounters:   07/23/21 110.7 kg (244 lb)   02/19/20 110.7 kg (244 lb 2 oz)   11/15/16 105.7 kg (233 lb)     Body mass index is 39.4 kg/m .    PHYSICAL EXAM:  Wearing mask when entering room?  " No  Constitutional:  Reveals pleasant anxious overweight woman who ambulates without assistance    Heart regular rate and rhythm    MEDICATIONS:  Current Outpatient Medications   Medication Sig Dispense Refill    celecoxib (CELEBREX) 200 MG capsule Take 1 capsule (200 mg) by mouth daily. 90 capsule 3    cholecalciferol, vitamin D3, 1,000 unit (25 mcg) tablet Take 5,000 Units by mouth daily      gabapentin (NEURONTIN) 300 MG capsule TAKE 1 CAPSULE AT BEDTIME 90 capsule 3    LORazepam (ATIVAN) 1 MG tablet [LORAZEPAM (ATIVAN) 1 MG TABLET] Take 1 mg by mouth as needed for anxiety. For dental appointments       Multiple Vitamins-Minerals (ICAPS AREDS 2 PO)       predniSONE (DELTASONE) 10 MG tablet Take 1 tablet (10 mg) by mouth every morning.      Pyridoxine HCl (VITAMIN B6) 250 MG TABS       venlafaxine (EFFEXOR XR) 75 MG 24 hr capsule Take 1 capsule (75 mg) by mouth daily. 90 capsule 3       Notes summarized:   Labs, x-rays, cardiology, GI tests reviewed: Colonoscopy and biopsy  Recent Labs   Lab Test 09/16/22  1650      POTASSIUM 4.1   CR 0.63   URIC 3.9   TSH 1.27     Lab Results   Component Value Date    CHOL 216 09/16/2022     New orders:   Orders Placed This Encounter   Procedures    INFLUENZA VACCINE TRIVALENT(FLUBLOK)    Lipid Profile    Basic metabolic panel    CBC with platelets and differential    CBC with Platelets & Differential       Independent review of:  Supplemental history by:      Start Time: 4:33 PM  End time:  5:05 PM  Conversation plus orders:  32 minutes  Dictation time:  3 minutes    The visit lasted a total of 35 minutes     Gino Mensah MD  Internal Medicine  [facility]

## 2024-09-11 LAB
ANION GAP SERPL CALCULATED.3IONS-SCNC: 11 MMOL/L (ref 7–15)
BUN SERPL-MCNC: 18.4 MG/DL (ref 8–23)
CALCIUM SERPL-MCNC: 9.6 MG/DL (ref 8.8–10.4)
CHLORIDE SERPL-SCNC: 102 MMOL/L (ref 98–107)
CHOLEST SERPL-MCNC: 209 MG/DL
CREAT SERPL-MCNC: 0.65 MG/DL (ref 0.51–0.95)
EGFRCR SERPLBLD CKD-EPI 2021: >90 ML/MIN/1.73M2
FASTING STATUS PATIENT QL REPORTED: NO
FASTING STATUS PATIENT QL REPORTED: NO
GLUCOSE SERPL-MCNC: 90 MG/DL (ref 70–99)
HCO3 SERPL-SCNC: 27 MMOL/L (ref 22–29)
HDLC SERPL-MCNC: 65 MG/DL
LDLC SERPL CALC-MCNC: 124 MG/DL
NONHDLC SERPL-MCNC: 144 MG/DL
POTASSIUM SERPL-SCNC: 4.8 MMOL/L (ref 3.4–5.3)
SODIUM SERPL-SCNC: 140 MMOL/L (ref 135–145)
TRIGL SERPL-MCNC: 98 MG/DL

## 2024-09-24 ENCOUNTER — PATIENT OUTREACH (OUTPATIENT)
Dept: CARE COORDINATION | Facility: CLINIC | Age: 64
End: 2024-09-24
Payer: COMMERCIAL

## 2024-10-08 ENCOUNTER — PATIENT OUTREACH (OUTPATIENT)
Dept: CARE COORDINATION | Facility: CLINIC | Age: 64
End: 2024-10-08
Payer: COMMERCIAL

## 2024-10-31 ENCOUNTER — TRANSFERRED RECORDS (OUTPATIENT)
Dept: HEALTH INFORMATION MANAGEMENT | Facility: CLINIC | Age: 64
End: 2024-10-31
Payer: COMMERCIAL

## 2024-12-01 ENCOUNTER — HEALTH MAINTENANCE LETTER (OUTPATIENT)
Age: 64
End: 2024-12-01

## 2025-04-22 ENCOUNTER — PATIENT OUTREACH (OUTPATIENT)
Dept: CARE COORDINATION | Facility: CLINIC | Age: 65
End: 2025-04-22
Payer: COMMERCIAL